# Patient Record
Sex: MALE | Race: WHITE | NOT HISPANIC OR LATINO | Employment: FULL TIME | URBAN - METROPOLITAN AREA
[De-identification: names, ages, dates, MRNs, and addresses within clinical notes are randomized per-mention and may not be internally consistent; named-entity substitution may affect disease eponyms.]

---

## 2020-05-23 ENCOUNTER — OFFICE VISIT (OUTPATIENT)
Dept: URGENT CARE | Facility: CLINIC | Age: 20
End: 2020-05-23
Payer: COMMERCIAL

## 2020-05-23 VITALS
HEART RATE: 115 BPM | BODY MASS INDEX: 39.17 KG/M2 | TEMPERATURE: 99 F | WEIGHT: 315 LBS | HEIGHT: 75 IN | OXYGEN SATURATION: 96 %

## 2020-05-23 DIAGNOSIS — R10.9 ABDOMINAL CRAMPING: Primary | ICD-10-CM

## 2020-05-23 PROCEDURE — 99202 OFFICE O/P NEW SF 15 MIN: CPT | Mod: S$GLB,,, | Performed by: EMERGENCY MEDICINE

## 2020-05-23 PROCEDURE — 99202 PR OFFICE/OUTPT VISIT, NEW, LEVL II, 15-29 MIN: ICD-10-PCS | Mod: S$GLB,,, | Performed by: EMERGENCY MEDICINE

## 2020-05-23 NOTE — LETTER
May 23, 2020      [facility] [place] [address]  Phone: [phone]  Fax: [phone]       Patient: Sukumar Rothman   YOB: 2000  Date of Visit: 05/23/2020    To Whom It May Concern:    Gilberto Rothman  was at [facility] on 05/23/2020. He may return to work/school on 5/24/20 with no restrictions. If you have any questions or concerns, or if I can be of further assistance, please do not hesitate to contact me.    Sincerely,              Jordy Baldwin MD

## 2020-05-23 NOTE — PROGRESS NOTES
"Subjective:       Patient ID: Sukumar Rothman is a 19 y.o. male.    Vitals:  height is 6' 3" (1.905 m) and weight is 145.2 kg (320 lb) (abnormal). His oral temperature is 98.9 °F (37.2 °C). His pulse is 115 (abnormal). His oxygen saturation is 96%.     Chief Complaint: Abdominal Pain    Patient presents with previous abdominal pain yesterday . He says he woke up with his stomach cramping yesterday . He denies diarrhea and or vomiting and nausea . Patient says he tried to go back to work today and they told him he can not come in till he has a note . Patient says issue is completely resolved, states ate lasagna.    Abdominal Pain   This is a new problem. The current episode started yesterday. The onset quality is gradual. The problem occurs intermittently. The problem has been rapidly improving. The pain is located in the generalized abdominal region. The pain is at a severity of 0/10. The patient is experiencing no pain. The abdominal pain does not radiate. Pertinent negatives include no arthralgias, belching, constipation, diarrhea, frequency, headaches, hematochezia, nausea or vomiting. Nothing aggravates the pain. The pain is relieved by nothing. He has tried nothing for the symptoms. The treatment provided no relief. There is no history of colon cancer, irritable bowel syndrome, pancreatitis or ulcerative colitis. Patient's medical history does not include UTI.       Constitution: Negative for chills and sweating.   HENT: Negative for ear pain, ear discharge and congestion.    Respiratory: Negative for cough.    Gastrointestinal: Positive for abdominal pain. Negative for abdominal bloating, nausea, vomiting, constipation, diarrhea and bright red blood in stool.   Genitourinary: Negative for frequency.   Musculoskeletal: Negative for pain, trauma and joint pain.   Neurological: Negative for headaches.       Objective:      Physical Exam   Constitutional: He is oriented to person, place, and time.   Overweight "   HENT:   Head: Normocephalic and atraumatic.   Eyes: No scleral icterus.   Neck: Normal range of motion. Neck supple.   Cardiovascular: Normal rate, regular rhythm and normal heart sounds.   Pulmonary/Chest: Breath sounds normal.   Abdominal: Soft. There is no tenderness. There is no guarding.   Musculoskeletal: Normal range of motion.   Neurological: He is alert and oriented to person, place, and time.   Skin: Skin is warm and dry.   Psychiatric: He has a normal mood and affect. His behavior is normal.         Assessment:       1. Abdominal cramping        Plan:         Abdominal cramping         Jordy Baldwin MD  Go to the Emergency Department for any problems  Call your PCP for follow up next available.

## 2020-05-23 NOTE — PATIENT INSTRUCTIONS
Jordy Baldwin MD  Go to the Emergency Department for any problems  Call your PCP for follow up next available.    Abdominal Pain    Abdominal pain is pain in the stomach or belly area. Everyone has this pain from time to time. In many cases it goes away on its own. But abdominal pain can sometimes be due to a serious problem, such as appendicitis. So its important to know when to seek help.  Causes of abdominal pain  There are many possible causes of abdominal pain. Common causes in adults include:  · Constipation, diarrhea, or gas  · Stomach acid flowing back up into the esophagus (acid reflux or heartburn)  · Severe acid reflux, called GERD (gastroesophageal reflux disease)  · A sore in the lining of the stomach or small intestine (peptic ulcer)  · Inflammation of the gallbladder, liver, or pancreas  · Gallstones or kidney stones  · Appendicitis   · Intestinal blockage   · An internal organ pushing through a muscle or other tissue (hernia)  · Urinary tract infections  · In women, menstrual cramps, fibroids, or endometriosis  · Inflammation or infection of the intestines  Diagnosing the cause of abdominal pain  Your healthcare provider will do a physical exam help find the cause of your pain. If needed, tests will be ordered. Belly pain has many possible causes. So it can be hard to find the reason for your pain. Giving details about your pain can help. Tell your provider where and when you feel the pain, and what makes it better or worse. Also let your provider know if you have other symptoms such as:  · Fever  · Tiredness  · Upset stomach (nausea)  · Vomiting  · Changes in bathroom habits  Treating abdominal pain  Some causes of pain need emergency medical treatment right away. These include appendicitis or a bowel blockage. Other problems can be treated with rest, fluids, or medicines. Your healthcare provider can give you specific instructions for treatment or self-care based on what is causing your pain.  If  you have vomiting or diarrhea, sip water or other clear fluids. When you are ready to eat solid foods again, start with small amounts of easy-to-digest, low-fat foods. These include apple sauce, toast, or crackers.   When to seek medical care  Call 911 or go to the hospital right away if you:  · Cant pass stool and are vomiting  · Are vomiting blood or have bloody diarrhea or black, tarry diarrhea  · Have chest, neck, or shoulder pain  · Feel like you might pass out  · Have pain in your shoulder blades with nausea  · Have sudden, severe belly pain  · Have new, severe pain unlike any you have felt before  · Have a belly that is rigid, hard, and tender to touch  Call your healthcare provider if you have:  · Pain for more than 5 days  · Bloating for more than 2 days  · Diarrhea for more than 5 days  · A fever of 100.4°F (38.0°C) or higher, or as directed by your provider  · Pain that gets worse  · Weight loss for no reason  · Continued lack of appetite  · Blood in your stool  How to prevent abdominal pain  Here are some tips to help prevent abdominal pain:  · Eat smaller amounts of food at one time.  · Avoid greasy, fried, or other high-fat foods.  · Avoid foods that give you gas.  · Exercise regularly.  · Drink plenty of fluids.  To help prevent GERD symptoms:  · Quit smoking.  · Reduce alcohol and certain foods that increase stomach acid.  · Avoid aspirin and over-the-counter pain and fever medicines (NSAIDS or nonsteroidal anti-inflammatory drugs), if possible  · Lose extra weight.  · Finish eating at least 2 hours before you go to bed or lie down.  · Raise the head of your bed.  Date Last Reviewed: 7/1/2016  © 5871-9251 [employer]. [address] [place] [address], [place] [address]. All rights reserved. This information is not intended as a substitute for professional medical care. Always follow your healthcare professional's instructions.

## 2021-04-13 ENCOUNTER — OFFICE VISIT (OUTPATIENT)
Dept: URGENT CARE | Facility: CLINIC | Age: 21
End: 2021-04-13
Payer: COMMERCIAL

## 2021-04-13 VITALS
HEIGHT: 75 IN | WEIGHT: 315 LBS | OXYGEN SATURATION: 97 % | BODY MASS INDEX: 39.17 KG/M2 | DIASTOLIC BLOOD PRESSURE: 73 MMHG | RESPIRATION RATE: 16 BRPM | TEMPERATURE: 98 F | HEART RATE: 77 BPM | SYSTOLIC BLOOD PRESSURE: 110 MMHG

## 2021-04-13 DIAGNOSIS — Z71.6 TOBACCO ABUSE COUNSELING: ICD-10-CM

## 2021-04-13 DIAGNOSIS — L03.011 CELLULITIS OF RIGHT FINGER: ICD-10-CM

## 2021-04-13 DIAGNOSIS — M79.644 FINGER PAIN, RIGHT: ICD-10-CM

## 2021-04-13 DIAGNOSIS — Z23 NEED FOR TDAP VACCINATION: ICD-10-CM

## 2021-04-13 DIAGNOSIS — L03.011 PARONYCHIA OF RIGHT INDEX FINGER: Primary | ICD-10-CM

## 2021-04-13 DIAGNOSIS — E66.01 MORBID OBESITY: ICD-10-CM

## 2021-04-13 PROCEDURE — 3008F PR BODY MASS INDEX (BMI) DOCUMENTED: ICD-10-PCS | Mod: CPTII,S$GLB,, | Performed by: PHYSICIAN ASSISTANT

## 2021-04-13 PROCEDURE — 96372 THER/PROPH/DIAG INJ SC/IM: CPT | Mod: 59,S$GLB,, | Performed by: PHYSICIAN ASSISTANT

## 2021-04-13 PROCEDURE — 3008F BODY MASS INDEX DOCD: CPT | Mod: CPTII,S$GLB,, | Performed by: PHYSICIAN ASSISTANT

## 2021-04-13 PROCEDURE — 90715 TDAP VACCINE GREATER THAN OR EQUAL TO 7YO IM: ICD-10-PCS | Mod: S$GLB,,, | Performed by: PHYSICIAN ASSISTANT

## 2021-04-13 PROCEDURE — 1125F PR PAIN SEVERITY QUANTIFIED, PAIN PRESENT: ICD-10-PCS | Mod: S$GLB,,, | Performed by: PHYSICIAN ASSISTANT

## 2021-04-13 PROCEDURE — 99214 OFFICE O/P EST MOD 30 MIN: CPT | Mod: 25,S$GLB,, | Performed by: PHYSICIAN ASSISTANT

## 2021-04-13 PROCEDURE — 26010 DRAINAGE OF FINGER ABSCESS: CPT | Mod: S$GLB,,, | Performed by: PHYSICIAN ASSISTANT

## 2021-04-13 PROCEDURE — 90471 IMMUNIZATION ADMIN: CPT | Mod: 59,S$GLB,, | Performed by: PHYSICIAN ASSISTANT

## 2021-04-13 PROCEDURE — 99214 PR OFFICE/OUTPT VISIT, EST, LEVL IV, 30-39 MIN: ICD-10-PCS | Mod: 25,S$GLB,, | Performed by: PHYSICIAN ASSISTANT

## 2021-04-13 PROCEDURE — 26010 INCISION & DRAINAGE: ICD-10-PCS | Mod: S$GLB,,, | Performed by: PHYSICIAN ASSISTANT

## 2021-04-13 PROCEDURE — 1125F AMNT PAIN NOTED PAIN PRSNT: CPT | Mod: S$GLB,,, | Performed by: PHYSICIAN ASSISTANT

## 2021-04-13 PROCEDURE — 90471 TDAP VACCINE GREATER THAN OR EQUAL TO 7YO IM: ICD-10-PCS | Mod: 59,S$GLB,, | Performed by: PHYSICIAN ASSISTANT

## 2021-04-13 PROCEDURE — 96372 PR INJECTION,THERAP/PROPH/DIAG2ST, IM OR SUBCUT: ICD-10-PCS | Mod: 59,S$GLB,, | Performed by: PHYSICIAN ASSISTANT

## 2021-04-13 PROCEDURE — 90715 TDAP VACCINE 7 YRS/> IM: CPT | Mod: S$GLB,,, | Performed by: PHYSICIAN ASSISTANT

## 2021-04-13 RX ORDER — KETOROLAC TROMETHAMINE 30 MG/ML
30 INJECTION, SOLUTION INTRAMUSCULAR; INTRAVENOUS
Status: COMPLETED | OUTPATIENT
Start: 2021-04-13 | End: 2021-04-13

## 2021-04-13 RX ORDER — SULFAMETHOXAZOLE AND TRIMETHOPRIM 800; 160 MG/1; MG/1
1 TABLET ORAL 2 TIMES DAILY
Qty: 14 TABLET | Refills: 0 | Status: SHIPPED | OUTPATIENT
Start: 2021-04-13 | End: 2021-04-20

## 2021-04-13 RX ORDER — CLINDAMYCIN PHOSPHATE 150 MG/ML
600 INJECTION, SOLUTION INTRAVENOUS
Status: COMPLETED | OUTPATIENT
Start: 2021-04-13 | End: 2021-04-13

## 2021-04-13 RX ORDER — MUPIROCIN 20 MG/G
OINTMENT TOPICAL 3 TIMES DAILY
Qty: 1 TUBE | Refills: 0 | Status: SHIPPED | OUTPATIENT
Start: 2021-04-13 | End: 2021-04-20

## 2021-04-13 RX ADMIN — KETOROLAC TROMETHAMINE 30 MG: 30 INJECTION, SOLUTION INTRAMUSCULAR; INTRAVENOUS at 10:04

## 2021-04-13 RX ADMIN — CLINDAMYCIN PHOSPHATE 600 MG: 150 INJECTION, SOLUTION INTRAVENOUS at 10:04

## 2021-04-16 ENCOUNTER — PATIENT MESSAGE (OUTPATIENT)
Dept: RESEARCH | Facility: HOSPITAL | Age: 21
End: 2021-04-16

## 2021-11-07 ENCOUNTER — CLINICAL SUPPORT (OUTPATIENT)
Dept: URGENT CARE | Facility: CLINIC | Age: 21
End: 2021-11-07
Payer: COMMERCIAL

## 2021-11-07 DIAGNOSIS — Z78.9 NO KNOWN HEALTH PROBLEMS: Primary | ICD-10-CM

## 2021-11-07 LAB
CTP QC/QA: YES
SARS-COV-2 RDRP RESP QL NAA+PROBE: NEGATIVE

## 2021-11-07 PROCEDURE — 99211 OFF/OP EST MAY X REQ PHY/QHP: CPT | Mod: S$GLB,CS,,

## 2021-11-07 PROCEDURE — U0002: ICD-10-PCS | Mod: QW,S$GLB,,

## 2021-11-07 PROCEDURE — 99211 PR OFFICE/OUTPT VISIT, EST, LEVL I: ICD-10-PCS | Mod: S$GLB,CS,,

## 2021-11-07 PROCEDURE — U0002 COVID-19 LAB TEST NON-CDC: HCPCS | Mod: QW,S$GLB,,

## 2021-11-23 ENCOUNTER — OFFICE VISIT (OUTPATIENT)
Dept: URGENT CARE | Facility: CLINIC | Age: 21
End: 2021-11-23
Payer: COMMERCIAL

## 2021-11-23 VITALS
RESPIRATION RATE: 18 BRPM | HEIGHT: 75 IN | OXYGEN SATURATION: 98 % | HEART RATE: 90 BPM | DIASTOLIC BLOOD PRESSURE: 84 MMHG | TEMPERATURE: 99 F | WEIGHT: 280 LBS | SYSTOLIC BLOOD PRESSURE: 124 MMHG | BODY MASS INDEX: 34.82 KG/M2

## 2021-11-23 DIAGNOSIS — S61.215A LACERATION OF LEFT RING FINGER WITHOUT FOREIGN BODY WITHOUT DAMAGE TO NAIL, INITIAL ENCOUNTER: Primary | ICD-10-CM

## 2021-11-23 PROCEDURE — 99213 PR OFFICE/OUTPT VISIT, EST, LEVL III, 20-29 MIN: ICD-10-PCS | Mod: 25,S$GLB,,

## 2021-11-23 PROCEDURE — 12001 LACERATION REPAIR: ICD-10-PCS | Mod: S$GLB,,,

## 2021-11-23 PROCEDURE — 99213 OFFICE O/P EST LOW 20 MIN: CPT | Mod: 25,S$GLB,,

## 2021-11-23 PROCEDURE — 12001 RPR S/N/AX/GEN/TRNK 2.5CM/<: CPT | Mod: S$GLB,,,

## 2021-11-23 RX ORDER — IBUPROFEN 600 MG/1
600 TABLET ORAL 3 TIMES DAILY
Qty: 30 TABLET | Refills: 0 | Status: SHIPPED | OUTPATIENT
Start: 2021-11-23 | End: 2021-12-03

## 2021-11-23 RX ORDER — DOXYCYCLINE 100 MG/1
100 CAPSULE ORAL 2 TIMES DAILY
Qty: 14 CAPSULE | Refills: 0 | Status: SHIPPED | OUTPATIENT
Start: 2021-11-23 | End: 2021-11-30

## 2021-12-01 ENCOUNTER — CLINICAL SUPPORT (OUTPATIENT)
Dept: URGENT CARE | Facility: CLINIC | Age: 21
End: 2021-12-01
Payer: COMMERCIAL

## 2021-12-01 VITALS
BODY MASS INDEX: 34.82 KG/M2 | WEIGHT: 280 LBS | OXYGEN SATURATION: 96 % | HEART RATE: 100 BPM | RESPIRATION RATE: 18 BRPM | HEIGHT: 75 IN | TEMPERATURE: 98 F | SYSTOLIC BLOOD PRESSURE: 128 MMHG | DIASTOLIC BLOOD PRESSURE: 85 MMHG

## 2021-12-01 DIAGNOSIS — Z48.02 VISIT FOR SUTURE REMOVAL: Primary | ICD-10-CM

## 2021-12-01 PROCEDURE — 99499 NO LOS: ICD-10-PCS | Mod: S$GLB,,,

## 2021-12-01 PROCEDURE — 99499 UNLISTED E&M SERVICE: CPT | Mod: S$GLB,,,

## 2021-12-01 PROCEDURE — 99024 SUTURE REMOVAL: ICD-10-PCS | Mod: S$GLB,,,

## 2021-12-01 PROCEDURE — 99024 POSTOP FOLLOW-UP VISIT: CPT | Mod: S$GLB,,,

## 2022-11-15 ENCOUNTER — HOSPITAL ENCOUNTER (EMERGENCY)
Facility: HOSPITAL | Age: 22
Discharge: HOME | End: 2022-11-15
Attending: EMERGENCY MEDICINE
Payer: COMMERCIAL

## 2022-11-15 VITALS
DIASTOLIC BLOOD PRESSURE: 69 MMHG | WEIGHT: 315 LBS | BODY MASS INDEX: 47.4 KG/M2 | TEMPERATURE: 100 F | SYSTOLIC BLOOD PRESSURE: 133 MMHG | RESPIRATION RATE: 14 BRPM | HEART RATE: 103 BPM | OXYGEN SATURATION: 91 %

## 2022-11-15 DIAGNOSIS — J10.1 INFLUENZA A: Primary | ICD-10-CM

## 2022-11-15 LAB
ANION GAP SERPL CALC-SCNC: 14 MMOL/L (ref 8–16)
BASOPHILS # BLD AUTO: 0.02 K/UL (ref 0–0.2)
BASOPHILS NFR BLD: 0.3 % (ref 0–1.9)
BUN SERPL-MCNC: 12 MG/DL (ref 6–20)
CALCIUM SERPL-MCNC: 9.3 MG/DL (ref 8.7–10.5)
CHLORIDE SERPL-SCNC: 101 MMOL/L (ref 95–110)
CK SERPL-CCNC: 123 U/L (ref 20–200)
CO2 SERPL-SCNC: 20 MMOL/L (ref 23–29)
CREAT SERPL-MCNC: 0.9 MG/DL (ref 0.5–1.4)
CTP QC/QA: YES
DIFFERENTIAL METHOD: ABNORMAL
EOSINOPHIL # BLD AUTO: 0 K/UL (ref 0–0.5)
EOSINOPHIL NFR BLD: 0.3 % (ref 0–8)
ERYTHROCYTE [DISTWIDTH] IN BLOOD BY AUTOMATED COUNT: 13.2 % (ref 11.5–14.5)
EST. GFR  (NO RACE VARIABLE): >60 ML/MIN/1.73 M^2
GLUCOSE SERPL-MCNC: 102 MG/DL (ref 70–110)
HCT VFR BLD AUTO: 38.5 % (ref 40–54)
HGB BLD-MCNC: 13.1 G/DL (ref 14–18)
IMM GRANULOCYTES # BLD AUTO: 0.04 K/UL (ref 0–0.04)
IMM GRANULOCYTES NFR BLD AUTO: 0.6 % (ref 0–0.5)
INFLUENZA A, MOLECULAR: POSITIVE
INFLUENZA B, MOLECULAR: NEGATIVE
LYMPHOCYTES # BLD AUTO: 0.5 K/UL (ref 1–4.8)
LYMPHOCYTES NFR BLD: 7.9 % (ref 18–48)
MCH RBC QN AUTO: 27.4 PG (ref 27–31)
MCHC RBC AUTO-ENTMCNC: 34 G/DL (ref 32–36)
MCV RBC AUTO: 81 FL (ref 82–98)
MONOCYTES # BLD AUTO: 0.9 K/UL (ref 0.3–1)
MONOCYTES NFR BLD: 13.5 % (ref 4–15)
NEUTROPHILS # BLD AUTO: 5 K/UL (ref 1.8–7.7)
NEUTROPHILS NFR BLD: 77.4 % (ref 38–73)
NRBC BLD-RTO: 0 /100 WBC
PLATELET # BLD AUTO: 220 K/UL (ref 150–450)
PMV BLD AUTO: 8.6 FL (ref 9.2–12.9)
POTASSIUM SERPL-SCNC: 3.8 MMOL/L (ref 3.5–5.1)
RBC # BLD AUTO: 4.78 M/UL (ref 4.6–6.2)
SARS-COV-2 RDRP RESP QL NAA+PROBE: NEGATIVE
SODIUM SERPL-SCNC: 135 MMOL/L (ref 136–145)
SPECIMEN SOURCE: ABNORMAL
WBC # BLD AUTO: 6.43 K/UL (ref 3.9–12.7)

## 2022-11-15 PROCEDURE — 63600175 PHARM REV CODE 636 W HCPCS: Performed by: EMERGENCY MEDICINE

## 2022-11-15 PROCEDURE — 25000003 PHARM REV CODE 250: Performed by: EMERGENCY MEDICINE

## 2022-11-15 PROCEDURE — 82550 ASSAY OF CK (CPK): CPT | Performed by: EMERGENCY MEDICINE

## 2022-11-15 PROCEDURE — 80048 BASIC METABOLIC PNL TOTAL CA: CPT | Performed by: EMERGENCY MEDICINE

## 2022-11-15 PROCEDURE — 87502 INFLUENZA DNA AMP PROBE: CPT | Performed by: EMERGENCY MEDICINE

## 2022-11-15 PROCEDURE — 85025 COMPLETE CBC W/AUTO DIFF WBC: CPT | Performed by: EMERGENCY MEDICINE

## 2022-11-15 PROCEDURE — 96361 HYDRATE IV INFUSION ADD-ON: CPT

## 2022-11-15 PROCEDURE — 96374 THER/PROPH/DIAG INJ IV PUSH: CPT

## 2022-11-15 PROCEDURE — 87635 SARS-COV-2 COVID-19 AMP PRB: CPT | Performed by: EMERGENCY MEDICINE

## 2022-11-15 PROCEDURE — 99284 EMERGENCY DEPT VISIT MOD MDM: CPT | Mod: 25

## 2022-11-15 RX ORDER — OSELTAMIVIR PHOSPHATE 75 MG/1
75 CAPSULE ORAL
Status: COMPLETED | OUTPATIENT
Start: 2022-11-15 | End: 2022-11-15

## 2022-11-15 RX ORDER — IBUPROFEN 600 MG/1
600 TABLET ORAL EVERY 6 HOURS PRN
Qty: 20 TABLET | Refills: 0 | Status: SHIPPED | OUTPATIENT
Start: 2022-11-15 | End: 2024-01-19

## 2022-11-15 RX ORDER — ACETAMINOPHEN 500 MG
1000 TABLET ORAL
Status: COMPLETED | OUTPATIENT
Start: 2022-11-15 | End: 2022-11-15

## 2022-11-15 RX ORDER — ACETAMINOPHEN 500 MG
1000 TABLET ORAL EVERY 6 HOURS PRN
Qty: 50 TABLET | Refills: 0 | Status: SHIPPED | OUTPATIENT
Start: 2022-11-15

## 2022-11-15 RX ORDER — ONDANSETRON 4 MG/1
4 TABLET, ORALLY DISINTEGRATING ORAL EVERY 6 HOURS PRN
Qty: 15 TABLET | Refills: 0 | Status: SHIPPED | OUTPATIENT
Start: 2022-11-15 | End: 2024-01-19

## 2022-11-15 RX ORDER — KETOROLAC TROMETHAMINE 30 MG/ML
10 INJECTION, SOLUTION INTRAMUSCULAR; INTRAVENOUS
Status: COMPLETED | OUTPATIENT
Start: 2022-11-15 | End: 2022-11-15

## 2022-11-15 RX ORDER — OSELTAMIVIR PHOSPHATE 75 MG/1
75 CAPSULE ORAL 2 TIMES DAILY
Qty: 10 CAPSULE | Refills: 0 | Status: SHIPPED | OUTPATIENT
Start: 2022-11-15 | End: 2022-11-20

## 2022-11-15 RX ADMIN — KETOROLAC TROMETHAMINE 10 MG: 30 INJECTION, SOLUTION INTRAMUSCULAR; INTRAVENOUS at 04:11

## 2022-11-15 RX ADMIN — SODIUM CHLORIDE 1000 ML: 0.9 INJECTION, SOLUTION INTRAVENOUS at 04:11

## 2022-11-15 RX ADMIN — ACETAMINOPHEN 1000 MG: 500 TABLET ORAL at 03:11

## 2022-11-15 RX ADMIN — OSELTAMIVIR PHOSPHATE 75 MG: 75 CAPSULE ORAL at 04:11

## 2022-11-15 NOTE — ED TRIAGE NOTES
Patient arrived to ED from Urgent care with complaints of elevated HR.   EKG and labs drawn PTA. Unknown results by patient at this time. Patient denies CP, SOB, cough, abdominal pain, dysuria.   States he felt warm today but did not check temperature. Temperature not checked at urgent care PTA.   Awake, alert, no sign of acute distress.

## 2022-11-15 NOTE — Clinical Note
"Sukumar"Ghassan Rothman was seen and treated in our emergency department on 11/15/2022.  He may return to work on 11/21/2022.       If you have any questions or concerns, please don't hesitate to call.      Robert Bull MD"

## 2022-11-15 NOTE — ED PROVIDER NOTES
Encounter Date: 11/15/2022       History     Chief Complaint   Patient presents with    Fever     Pt was recently placed on antibiotics for sinus infection,  pt continues to complains of nasal congestion and pressure in face, denies n/v      This 22-year-old man who presents the emergency department after having gone to an urgent care for persistent symptoms despite having been treated for sinus infection 2 weeks prior.  He notes that today he began have a fever, upon arrival to the urgent care he was given a prescription for metoprolol and then referred to the emergency department for further evaluation.  On arrival here he is tachycardic, febrile.  He endorses a frontal headache, has not take anything to try and help with this, nothing in particular seems to make it any better, it seems to be progressive in mildly worsening with time.  He can not recall anything like this in the past.    Review of patient's allergies indicates:  No Known Allergies  No past medical history on file.  No past surgical history on file.  Family History   Problem Relation Age of Onset    No Known Problems Mother     No Known Problems Father      Social History     Tobacco Use    Smoking status: Every Day    Smokeless tobacco: Never   Substance Use Topics    Drug use: Never     Review of Systems  Constitutional-positive fever  HEENT-no congestion  Eyes-no redness  Respiratory-no shortness of breath  Cardio-no chest pain  GI-no abdominal pain  Endocrine-no cold intolerance  -no difficulty urinating  MSK-no myalgias  Skin-no rashes  Allergy-no environmental allergy  Neurologic-positive headache  Hematology-no swollen nodes  Behavioral-no confusion  Physical Exam     Initial Vitals [11/15/22 1504]   BP Pulse Resp Temp SpO2   131/80 (!) 138 (!) 24 (!) 102.9 °F (39.4 °C) 95 %      MAP       --         Physical Exam  Constitutional:  Generally well-appearing 22-year-old man in no obvious distress  Eyes: Conjunctivae normal.  ENT       Head:  Normocephalic, atraumatic.       Nose: Normal external appearance        Mouth/Throat: no strigulous respirations   Hematological/Lymphatic/Immunilogical: no visible lymphadenopathy   Cardiovascular:  Rapid rate,   Respiratory: Normal respiratory effort.   Gastrointestinal: non distended   Musculoskeletal: Normal range of motion in all extremities. No obvious deformities or swelling.  Neurologic: Alert, oriented. Normal speech and language. No gross focal neurologic deficits are appreciated.  Skin: Skin is warm, dry. No rash noted.  Psychiatric: Mood and affect are normal.   ED Course   Procedures  Labs Reviewed   INFLUENZA A & B BY MOLECULAR - Abnormal; Notable for the following components:       Result Value    Influenza A, Molecular Positive (*)     All other components within normal limits   BASIC METABOLIC PANEL - Abnormal; Notable for the following components:    Sodium 135 (*)     CO2 20 (*)     All other components within normal limits   CBC W/ AUTO DIFFERENTIAL - Abnormal; Notable for the following components:    Hemoglobin 13.1 (*)     Hematocrit 38.5 (*)     MCV 81 (*)     MPV 8.6 (*)     Immature Granulocytes 0.6 (*)     Lymph # 0.5 (*)     Gran % 77.4 (*)     Lymph % 7.9 (*)     All other components within normal limits   CK   SARS-COV-2 RDRP GENE          Imaging Results    None          Medications   sodium chloride 0.9% bolus 1,000 mL (1,000 mLs Intravenous New Bag 11/15/22 1616)   acetaminophen tablet 1,000 mg (1,000 mg Oral Given 11/15/22 1507)   ketorolac injection 9.999 mg (9.999 mg Intravenous Given 11/15/22 1614)   oseltamivir capsule 75 mg (75 mg Oral Given 11/15/22 1614)     Medical Decision Making:   History:   I obtained history from: someone other than patient.       <> Summary of History: Father at the bedside notes redness in the face  Old Medical Records: I decided to obtain old medical records.  Old Records Summarized: records from clinic visits and records from previous  admission(s).  Differential Diagnosis:   Flu, COVID, pneumonia, sepsis  Clinical Tests:   Lab Tests: Ordered and Reviewed  ED Management:  Febrile, tachycardic on arrival.  One day of fever.  Flu A positive, likely his source of infection at this time.  Will plan for treatment of the same, he is comfortable on room air at this time, neurologically intact and overall well-appearing.    Has improved with treatment of his fever in the emergency department.  Discussed the importance of returning case of worsening symptoms and appropriate symptom care in the outpatient setting.                        Clinical Impression:   Final diagnoses:  [J10.1] Influenza A (Primary)        ED Disposition Condition    Discharge Stable          ED Prescriptions       Medication Sig Dispense Start Date End Date Auth. Provider    oseltamivir (TAMIFLU) 75 MG capsule Take 1 capsule (75 mg total) by mouth 2 (two) times daily. for 5 days 10 capsule 11/15/2022 11/20/2022 Robert Bull MD    acetaminophen (TYLENOL) 500 MG tablet Take 2 tablets (1,000 mg total) by mouth every 6 (six) hours as needed. 50 tablet 11/15/2022 -- Robert Bull MD    ibuprofen (ADVIL,MOTRIN) 600 MG tablet Take 1 tablet (600 mg total) by mouth every 6 (six) hours as needed for Pain. 20 tablet 11/15/2022 -- Robert Bull MD    ondansetron (ZOFRAN-ODT) 4 MG TbDL Take 1 tablet (4 mg total) by mouth every 6 (six) hours as needed (nausea). 15 tablet 11/15/2022 -- Robert Bull MD          Follow-up Information       Follow up With Specialties Details Why Contact Info    [facility] Emergency Dept Emergency Medicine Go to  As needed [address] [facility] [address] [phone]             Robert Bull MD  11/15/22 1013

## 2022-11-15 NOTE — DISCHARGE INSTRUCTIONS
Mr. Rothman,    Thank you for letting me care for you today! It was nice meeting you, and I hope you feel better soon.   If you would like access to your chart and what was done today please utilize the [facility].   Please come back to [facility] for all of your future medical needs.    Our goal in the emergency department is to always give you outstanding care and exceptional service. You may receive a survey by mail or e-mail in the next week regarding your experience in our ED. We would greatly appreciate you completing and returning the survey. Your feedback provides us with a way to recognize our staff who give very good care and it helps us learn how to improve when your experience was below our aspiration of excellence.     Sincerely,    Robert Bull MD  Board Certified Emergency Physician

## 2023-03-21 ENCOUNTER — LAB VISIT (OUTPATIENT)
Dept: LAB | Facility: HOSPITAL | Age: 23
End: 2023-03-21
Attending: INTERNAL MEDICINE
Payer: COMMERCIAL

## 2023-03-21 ENCOUNTER — OFFICE VISIT (OUTPATIENT)
Dept: RHEUMATOLOGY | Facility: CLINIC | Age: 23
End: 2023-03-21
Payer: COMMERCIAL

## 2023-03-21 VITALS
SYSTOLIC BLOOD PRESSURE: 132 MMHG | BODY MASS INDEX: 39.17 KG/M2 | HEIGHT: 75 IN | WEIGHT: 315 LBS | DIASTOLIC BLOOD PRESSURE: 86 MMHG | OXYGEN SATURATION: 98 % | HEART RATE: 121 BPM

## 2023-03-21 DIAGNOSIS — R21 RASH: ICD-10-CM

## 2023-03-21 DIAGNOSIS — E66.01 MORBID OBESITY: ICD-10-CM

## 2023-03-21 DIAGNOSIS — R76.8 POSITIVE ANA (ANTINUCLEAR ANTIBODY): ICD-10-CM

## 2023-03-21 DIAGNOSIS — Z71.89 COUNSELING AND COORDINATION OF CARE: ICD-10-CM

## 2023-03-21 DIAGNOSIS — R21 RASH: Primary | ICD-10-CM

## 2023-03-21 DIAGNOSIS — M25.50 ARTHRALGIA, UNSPECIFIED JOINT: ICD-10-CM

## 2023-03-21 LAB
25(OH)D3+25(OH)D2 SERPL-MCNC: 15 NG/ML (ref 30–96)
ALBUMIN SERPL BCP-MCNC: 3.9 G/DL (ref 3.5–5.2)
ALP SERPL-CCNC: 109 U/L (ref 55–135)
ALT SERPL W/O P-5'-P-CCNC: 40 U/L (ref 10–44)
ANION GAP SERPL CALC-SCNC: 9 MMOL/L (ref 8–16)
AST SERPL-CCNC: 23 U/L (ref 10–40)
BASOPHILS # BLD AUTO: 0.03 K/UL (ref 0–0.2)
BASOPHILS NFR BLD: 0.3 % (ref 0–1.9)
BILIRUB SERPL-MCNC: 0.5 MG/DL (ref 0.1–1)
BUN SERPL-MCNC: 13 MG/DL (ref 6–20)
C3 SERPL-MCNC: 173 MG/DL (ref 50–180)
C4 SERPL-MCNC: 36 MG/DL (ref 11–44)
CALCIUM SERPL-MCNC: 9.8 MG/DL (ref 8.7–10.5)
CCP AB SER IA-ACNC: <0.5 U/ML
CHLORIDE SERPL-SCNC: 103 MMOL/L (ref 95–110)
CK SERPL-CCNC: 96 U/L (ref 20–200)
CO2 SERPL-SCNC: 26 MMOL/L (ref 23–29)
CREAT SERPL-MCNC: 0.8 MG/DL (ref 0.5–1.4)
CRP SERPL-MCNC: 7.3 MG/L (ref 0–8.2)
DIFFERENTIAL METHOD: NORMAL
EOSINOPHIL # BLD AUTO: 0.1 K/UL (ref 0–0.5)
EOSINOPHIL NFR BLD: 1.1 % (ref 0–8)
ERYTHROCYTE [DISTWIDTH] IN BLOOD BY AUTOMATED COUNT: 13.9 % (ref 11.5–14.5)
ERYTHROCYTE [SEDIMENTATION RATE] IN BLOOD BY PHOTOMETRIC METHOD: 36 MM/HR (ref 0–23)
EST. GFR  (NO RACE VARIABLE): >60 ML/MIN/1.73 M^2
GLUCOSE SERPL-MCNC: 94 MG/DL (ref 70–110)
HBV SURFACE AB SER-ACNC: 130.11 MIU/ML
HBV SURFACE AB SER-ACNC: REACTIVE M[IU]/ML
HBV SURFACE AG SERPL QL IA: NORMAL
HCT VFR BLD AUTO: 44.2 % (ref 40–54)
HCV AB SERPL QL IA: NORMAL
HGB BLD-MCNC: 14.4 G/DL (ref 14–18)
HIV 1+2 AB+HIV1 P24 AG SERPL QL IA: NORMAL
IGA SERPL-MCNC: 341 MG/DL (ref 40–350)
IGG SERPL-MCNC: 1260 MG/DL (ref 650–1600)
IGM SERPL-MCNC: 185 MG/DL (ref 50–300)
IMM GRANULOCYTES # BLD AUTO: 0.03 K/UL (ref 0–0.04)
IMM GRANULOCYTES NFR BLD AUTO: 0.3 % (ref 0–0.5)
LYMPHOCYTES # BLD AUTO: 2.8 K/UL (ref 1–4.8)
LYMPHOCYTES NFR BLD: 32 % (ref 18–48)
MCH RBC QN AUTO: 27.4 PG (ref 27–31)
MCHC RBC AUTO-ENTMCNC: 32.6 G/DL (ref 32–36)
MCV RBC AUTO: 84 FL (ref 82–98)
MONOCYTES # BLD AUTO: 0.6 K/UL (ref 0.3–1)
MONOCYTES NFR BLD: 7.3 % (ref 4–15)
NEUTROPHILS # BLD AUTO: 5.2 K/UL (ref 1.8–7.7)
NEUTROPHILS NFR BLD: 59 % (ref 38–73)
NRBC BLD-RTO: 0 /100 WBC
PLATELET # BLD AUTO: 303 K/UL (ref 150–450)
PMV BLD AUTO: 9.9 FL (ref 9.2–12.9)
POTASSIUM SERPL-SCNC: 4.1 MMOL/L (ref 3.5–5.1)
PROT SERPL-MCNC: 7.8 G/DL (ref 6–8.4)
RBC # BLD AUTO: 5.25 M/UL (ref 4.6–6.2)
RHEUMATOID FACT SERPL-ACNC: <13 IU/ML (ref 0–15)
SODIUM SERPL-SCNC: 138 MMOL/L (ref 136–145)
THYROPEROXIDASE IGG SERPL-ACNC: <6 IU/ML
TSH SERPL DL<=0.005 MIU/L-ACNC: 0.76 UIU/ML (ref 0.4–4)
WBC # BLD AUTO: 8.78 K/UL (ref 3.9–12.7)

## 2023-03-21 PROCEDURE — 82306 VITAMIN D 25 HYDROXY: CPT | Performed by: INTERNAL MEDICINE

## 2023-03-21 PROCEDURE — 85613 RUSSELL VIPER VENOM DILUTED: CPT | Performed by: INTERNAL MEDICINE

## 2023-03-21 PROCEDURE — 86235 NUCLEAR ANTIGEN ANTIBODY: CPT | Mod: 59 | Performed by: INTERNAL MEDICINE

## 2023-03-21 PROCEDURE — 86140 C-REACTIVE PROTEIN: CPT | Performed by: INTERNAL MEDICINE

## 2023-03-21 PROCEDURE — 82784 ASSAY IGA/IGD/IGG/IGM EACH: CPT | Mod: 59 | Performed by: INTERNAL MEDICINE

## 2023-03-21 PROCEDURE — 85025 COMPLETE CBC W/AUTO DIFF WBC: CPT | Performed by: INTERNAL MEDICINE

## 2023-03-21 PROCEDURE — 1159F PR MEDICATION LIST DOCUMENTED IN MEDICAL RECORD: ICD-10-PCS | Mod: CPTII,S$GLB,, | Performed by: INTERNAL MEDICINE

## 2023-03-21 PROCEDURE — 86160 COMPLEMENT ANTIGEN: CPT | Mod: 59 | Performed by: INTERNAL MEDICINE

## 2023-03-21 PROCEDURE — 86038 ANTINUCLEAR ANTIBODIES: CPT | Performed by: INTERNAL MEDICINE

## 2023-03-21 PROCEDURE — 85652 RBC SED RATE AUTOMATED: CPT | Performed by: INTERNAL MEDICINE

## 2023-03-21 PROCEDURE — 3008F BODY MASS INDEX DOCD: CPT | Mod: CPTII,S$GLB,, | Performed by: INTERNAL MEDICINE

## 2023-03-21 PROCEDURE — 80053 COMPREHEN METABOLIC PANEL: CPT | Performed by: INTERNAL MEDICINE

## 2023-03-21 PROCEDURE — 3079F DIAST BP 80-89 MM HG: CPT | Mod: CPTII,S$GLB,, | Performed by: INTERNAL MEDICINE

## 2023-03-21 PROCEDURE — 85730 THROMBOPLASTIN TIME PARTIAL: CPT | Performed by: INTERNAL MEDICINE

## 2023-03-21 PROCEDURE — 1159F MED LIST DOCD IN RCRD: CPT | Mod: CPTII,S$GLB,, | Performed by: INTERNAL MEDICINE

## 2023-03-21 PROCEDURE — 87389 HIV-1 AG W/HIV-1&-2 AB AG IA: CPT | Performed by: INTERNAL MEDICINE

## 2023-03-21 PROCEDURE — 85635 REPTILASE TEST: CPT | Performed by: INTERNAL MEDICINE

## 2023-03-21 PROCEDURE — 86480 TB TEST CELL IMMUN MEASURE: CPT | Performed by: INTERNAL MEDICINE

## 2023-03-21 PROCEDURE — 84165 PROTEIN E-PHORESIS SERUM: CPT | Performed by: INTERNAL MEDICINE

## 2023-03-21 PROCEDURE — 99204 PR OFFICE/OUTPT VISIT, NEW, LEVL IV, 45-59 MIN: ICD-10-PCS | Mod: S$GLB,,, | Performed by: INTERNAL MEDICINE

## 2023-03-21 PROCEDURE — 83516 IMMUNOASSAY NONANTIBODY: CPT | Performed by: INTERNAL MEDICINE

## 2023-03-21 PROCEDURE — 84165 PATHOLOGIST INTERPRETATION SPE: ICD-10-PCS | Mod: 26,,, | Performed by: PATHOLOGY

## 2023-03-21 PROCEDURE — 86225 DNA ANTIBODY NATIVE: CPT | Performed by: INTERNAL MEDICINE

## 2023-03-21 PROCEDURE — 86200 CCP ANTIBODY: CPT | Performed by: INTERNAL MEDICINE

## 2023-03-21 PROCEDURE — 86235 NUCLEAR ANTIGEN ANTIBODY: CPT | Performed by: INTERNAL MEDICINE

## 2023-03-21 PROCEDURE — 86147 CARDIOLIPIN ANTIBODY EA IG: CPT | Performed by: INTERNAL MEDICINE

## 2023-03-21 PROCEDURE — 86431 RHEUMATOID FACTOR QUANT: CPT | Performed by: INTERNAL MEDICINE

## 2023-03-21 PROCEDURE — 99999 PR PBB SHADOW E&M-EST. PATIENT-LVL IV: CPT | Mod: PBBFAC,,, | Performed by: INTERNAL MEDICINE

## 2023-03-21 PROCEDURE — 3079F PR MOST RECENT DIASTOLIC BLOOD PRESSURE 80-89 MM HG: ICD-10-PCS | Mod: CPTII,S$GLB,, | Performed by: INTERNAL MEDICINE

## 2023-03-21 PROCEDURE — 85525 HEPARIN NEUTRALIZATION: CPT | Performed by: INTERNAL MEDICINE

## 2023-03-21 PROCEDURE — 87340 HEPATITIS B SURFACE AG IA: CPT | Performed by: INTERNAL MEDICINE

## 2023-03-21 PROCEDURE — 82550 ASSAY OF CK (CPK): CPT | Performed by: INTERNAL MEDICINE

## 2023-03-21 PROCEDURE — 82787 IGG 1 2 3 OR 4 EACH: CPT | Mod: 59 | Performed by: INTERNAL MEDICINE

## 2023-03-21 PROCEDURE — 86334 PATHOLOGIST INTERPRETATION IFE: ICD-10-PCS | Mod: 26,,, | Performed by: PATHOLOGY

## 2023-03-21 PROCEDURE — 85732 THROMBOPLASTIN TIME PARTIAL: CPT | Performed by: INTERNAL MEDICINE

## 2023-03-21 PROCEDURE — 84165 PROTEIN E-PHORESIS SERUM: CPT | Mod: 26,,, | Performed by: PATHOLOGY

## 2023-03-21 PROCEDURE — 85613 RUSSELL VIPER VENOM DILUTED: CPT | Mod: 91 | Performed by: INTERNAL MEDICINE

## 2023-03-21 PROCEDURE — 3075F SYST BP GE 130 - 139MM HG: CPT | Mod: CPTII,S$GLB,, | Performed by: INTERNAL MEDICINE

## 2023-03-21 PROCEDURE — 86146 BETA-2 GLYCOPROTEIN ANTIBODY: CPT | Performed by: INTERNAL MEDICINE

## 2023-03-21 PROCEDURE — 3008F PR BODY MASS INDEX (BMI) DOCUMENTED: ICD-10-PCS | Mod: CPTII,S$GLB,, | Performed by: INTERNAL MEDICINE

## 2023-03-21 PROCEDURE — 84445 ASSAY OF TSI GLOBULIN: CPT | Performed by: INTERNAL MEDICINE

## 2023-03-21 PROCEDURE — 86376 MICROSOMAL ANTIBODY EACH: CPT | Performed by: INTERNAL MEDICINE

## 2023-03-21 PROCEDURE — 86803 HEPATITIS C AB TEST: CPT | Performed by: INTERNAL MEDICINE

## 2023-03-21 PROCEDURE — 86334 IMMUNOFIX E-PHORESIS SERUM: CPT | Mod: 26,,, | Performed by: PATHOLOGY

## 2023-03-21 PROCEDURE — 86706 HEP B SURFACE ANTIBODY: CPT | Performed by: INTERNAL MEDICINE

## 2023-03-21 PROCEDURE — 85670 THROMBIN TIME PLASMA: CPT | Performed by: INTERNAL MEDICINE

## 2023-03-21 PROCEDURE — 86334 IMMUNOFIX E-PHORESIS SERUM: CPT | Performed by: INTERNAL MEDICINE

## 2023-03-21 PROCEDURE — 84443 ASSAY THYROID STIM HORMONE: CPT | Performed by: INTERNAL MEDICINE

## 2023-03-21 PROCEDURE — 99999 PR PBB SHADOW E&M-EST. PATIENT-LVL IV: ICD-10-PCS | Mod: PBBFAC,,, | Performed by: INTERNAL MEDICINE

## 2023-03-21 PROCEDURE — 86160 COMPLEMENT ANTIGEN: CPT | Performed by: INTERNAL MEDICINE

## 2023-03-21 PROCEDURE — 99204 OFFICE O/P NEW MOD 45 MIN: CPT | Mod: S$GLB,,, | Performed by: INTERNAL MEDICINE

## 2023-03-21 PROCEDURE — 3075F PR MOST RECENT SYSTOLIC BLOOD PRESS GE 130-139MM HG: ICD-10-PCS | Mod: CPTII,S$GLB,, | Performed by: INTERNAL MEDICINE

## 2023-03-21 NOTE — PROGRESS NOTES
RHEUMATOLOGY OUTPATIENT CLINIC NOTE    3/21/2023    Attending Rheumatologist: Mp Prado  Primary Care Provider: Primary Doctor No   Physician Requesting Consultation: Aaareferral Self  No address on file  Chief Complaint/Reason For Consultation:  Rash (Stiffness on head on and off/)      Subjective:       HPI  Sukumar Rothman is a 22 y.o. White male with medical history noted below who presents for evaluation of Lupus.     Patient presents for evaluation of Lupus. Notes he went to see a doctor in [facility] in October/November due to rash on face and facial warmth and was told to her had +MARY. He notes when working in the sun, being over heated and rash will break out. No change with alcohol. No rash elsewhere. At times has noted neck pain/stiffness, no clear exacerbating or alleviating factors. No FHX of CTD. Arthralgias of the knees and ankles, worse with activity, though not daily or affecting life. Does not take anything for pain. +Wt loss, fatigue. No Alopecia, Oral/Nasal Ulcers, Dry Eyes, Dry Mouth, Pleuritis/serositis, Arthritis, Easy Bruising, LAD, Raynaud's, Blood clots, GERD, Skin tightening, SOB, chest pain, fevers, wt loss, constipation/diarrhea, urinary issues. Of note works in [employer] outdoors.       Review of Systems   Constitutional:  Positive for fatigue. Negative for chills, fever and unexpected weight change.   HENT:  Negative for mouth sores and trouble swallowing.    Eyes:  Negative for redness and eye dryness.   Respiratory:  Negative for cough and shortness of breath.    Cardiovascular:  Negative for chest pain.   Gastrointestinal:  Negative for abdominal distention, constipation, diarrhea, nausea, vomiting and reflux.   Musculoskeletal:  Positive for arthralgias. Negative for back pain, gait problem, joint swelling, leg pain, myalgias, neck pain, neck stiffness and joint deformity.   Integumentary:  Positive for rash.   Neurological:  Positive for headaches. Negative for  weakness, numbness and memory loss.   Hematological:  Negative for adenopathy. Does not bruise/bleed easily.   Psychiatric/Behavioral:  Negative for confusion, decreased concentration, sleep disturbance and suicidal ideas. The patient is not nervous/anxious.    All other systems reviewed and are negative.     Chronic comorbid conditions affecting medical decision making today:  History reviewed. No pertinent past medical history.  History reviewed. No pertinent surgical history.  Family History   Problem Relation Age of Onset    No Known Problems Mother     No Known Problems Father      Social History     Substance and Sexual Activity   Alcohol Use None     Social History     Tobacco Use   Smoking Status Every Day   Smokeless Tobacco Never     Social History     Substance and Sexual Activity   Drug Use Never       Current Outpatient Medications:     acetaminophen (TYLENOL) 500 MG tablet, Take 2 tablets (1,000 mg total) by mouth every 6 (six) hours as needed., Disp: 50 tablet, Rfl: 0    ibuprofen (ADVIL,MOTRIN) 600 MG tablet, Take 1 tablet (600 mg total) by mouth every 6 (six) hours as needed for Pain., Disp: 20 tablet, Rfl: 0    ondansetron (ZOFRAN-ODT) 4 MG TbDL, Take 1 tablet (4 mg total) by mouth every 6 (six) hours as needed (nausea)., Disp: 15 tablet, Rfl: 0     Objective:         Vitals:    03/21/23 1002   BP: 132/86   Pulse: (!) 121     Physical Exam  Obese  Rash on face  Neck ROM ok  Can make fist, no synovitis  Wrists, Elbows, Shoulder ROM ok  Knee crepitus  Negative ankle/MTP    Reviewed old and all outside pertinent medical records available.    All lab results personally reviewed and interpreted by me.  Lab Results   Component Value Date    WBC 6.43 11/15/2022    HGB 13.1 (L) 11/15/2022    HCT 38.5 (L) 11/15/2022    MCV 81 (L) 11/15/2022    MCH 27.4 11/15/2022    MCHC 34.0 11/15/2022    RDW 13.2 11/15/2022     11/15/2022    MPV 8.6 (L) 11/15/2022       Lab Results   Component Value Date      (L) 11/15/2022    K 3.8 11/15/2022     11/15/2022    CO2 20 (L) 11/15/2022     11/15/2022    BUN 12 11/15/2022    CALCIUM 9.3 11/15/2022       No results found for: COLORU, APPEARANCEUA, SPECGRAV, PHUR, PHUA, PROTEINUA, GLUCOSEU, KETONESU, BLOODU, LEUKOCYTESUR, NITRITE, UROBILINOGEN    No results found for: CRP    No results found for: SEDRATE, ERYTHROCYTES    No results found for: MARY, RF, SEDRATE    No components found for: 25OHVITDTOT, 66OJSLSA7, 10TOHVDO3, METHODNOTE    No results found for: URICACID    No components found for: TSPOTTB        Imaging:  All imaging reviewed and independently interpreted by me.         ASSESSMENT / PLAN:     Sukumar Rothman is a 22 y.o. White male with:      1. Rash  - has facial rash, photosensitive otherwise no other evidence of CTD  - discussed symptoms and monitoring and work up  - will refer to DERM to see other possibilities or even topical options  - sun screen and sun protective clothing advised  - work up as below  - reassurance   - Ambulatory referral/consult to Dermatology; Future  - C-Reactive Protein; Future  - MARY Screen w/Reflex; Future  - Rheumatoid Factor; Future  - Sjogrens syndrome-B extractable nuclear antibody; Future  - CBC Auto Differential; Future  - Comprehensive Metabolic Panel; Future  - Cyclic Citrullinated Peptide Antibody, IgG; Future  - Sjogrens syndrome-A extractable nuclear antibody; Future  - Protein/Creatinine Ratio, Urine; Future  - C4 Complement; Future  - C3 Complement; Future  - Anti-Histone Antibody; Future  - Sedimentation rate; Future  - Urinalysis; Standing  - Hepatitis B Surface Ab, Qualitative; Future  - Vitamin D; Future  - TSH; Future  - Hepatitis C Antibody; Future  - Hepatitis B Surface Antigen; Future  - CK; Future  - Anti-Scleroderma Antibody; Future  - Quantiferon Gold TB; Future  - HIV 1/2 Ag/Ab (4th Gen); Future  - IgG 1, 2, 3, and 4; Future  - Immunoglobulins (IgG, IgA, IgM) Quantitative; Future  - Protein  Electrophoresis, Serum; Future  - Immunofixation Electrophoresis; Future  - DRVVT; Future  - Cardiolipin antibody; Future  - Beta-2 Glycoprotein Abs (IgA, IgG, IgM); Future  - Anti-DNA Ab, Double-Stranded; Future  - X-Ray Cervical Spine AP Lat with Flex Ex; Future  - THYROID PEROXIDASE ANTIBODY; Future  - THYROID STIMULATING IMMUNOGLOBULIN; Future    2. Positive MARY (antinuclear antibody)  - no MARY available for review  - will get labs as above     3. Arthralgia, unspecified joint  - likely functional   - discussed diagnosis and management  - wt loss  - Tylenol PRN  - reassurance and exercise     4. Other specified counseling  - over 10 minutes spent regarding below topics:  - Immunization counseling done.  - Weight loss counseling done.  - Nutrition and exercise counseling.  - Limitation of alcohol consumption.  - Regular exercise:  Aerobic and resistance.  - Medication counseling provided.    5. Morbid Obesity  - would benefit from decreasing at least 10% of body weight.  - recommended goal of losing 1 lb per week.  - consider nutritionist evaluation.      Follow up in about 8 weeks (around 5/16/2023).    Method of contact with patient concerns: [facility]    Disclaimer:  This note is prepared using voice recognition software and as such is likely to have errors and has not been proof read. Please contact me for questions.     Time spent: 45 minutes in face to face discussion concerning diagnosis, prognosis, review of lab and test results, benefits of treatment as well as management of disease, counseling of patient and coordination of care between various health care providers.  Greater than half the time spent was used for coordination of care and counseling of patient.    Mp Prado M.D.  Rheumatology Department   [facility]

## 2023-03-22 LAB
ALBUMIN SERPL ELPH-MCNC: 4.03 G/DL (ref 3.35–5.55)
ALPHA1 GLOB SERPL ELPH-MCNC: 0.32 G/DL (ref 0.17–0.41)
ALPHA2 GLOB SERPL ELPH-MCNC: 0.82 G/DL (ref 0.43–0.99)
ANA SER QL IF: NORMAL
ANTI-SSA ANTIBODY: 0.07 RATIO (ref 0–0.99)
ANTI-SSA INTERPRETATION: NEGATIVE
ANTI-SSB ANTIBODY: 0.05 RATIO (ref 0–0.99)
ANTI-SSB INTERPRETATION: NEGATIVE
B-GLOBULIN SERPL ELPH-MCNC: 1 G/DL (ref 0.5–1.1)
DSDNA AB SER-ACNC: NORMAL [IU]/ML
GAMMA GLOB SERPL ELPH-MCNC: 1.23 G/DL (ref 0.67–1.58)
GAMMA INTERFERON BACKGROUND BLD IA-ACNC: 0.23 IU/ML
INTERPRETATION SERPL IFE-IMP: NORMAL
M TB IFN-G CD4+ BCKGRND COR BLD-ACNC: -0.03 IU/ML
MITOGEN IGNF BCKGRD COR BLD-ACNC: 9.77 IU/ML
PROT SERPL-MCNC: 7.4 G/DL (ref 6–8.4)
TB GOLD PLUS: NEGATIVE
TB2 - NIL: -0.03 IU/ML

## 2023-03-23 LAB
PATHOLOGIST INTERPRETATION IFE: NORMAL
PATHOLOGIST INTERPRETATION SPE: NORMAL

## 2023-03-24 LAB
B2 GLYCOPROT1 IGA SER QL: 2.7 U/ML
B2 GLYCOPROT1 IGG SER QL: 1.3 U/ML
B2 GLYCOPROT1 IGM SER QL: <2.4 U/ML
CARDIOLIPIN IGG SER IA-ACNC: <9.4 GPL (ref 0–14.99)
CARDIOLIPIN IGM SER IA-ACNC: <9.4 MPL (ref 0–12.49)
ENA SCL70 AB SER-ACNC: <0.6 U/ML
HISTONE IGG SER IA-ACNC: 1.3 UNITS (ref 0–0.9)
IGG1 SER-MCNC: 529 MG/DL (ref 382–929)
IGG2 SER-MCNC: 487 MG/DL (ref 242–700)
IGG3 SER-MCNC: 68 MG/DL (ref 22–176)
IGG4 SER-MCNC: 68 MG/DL (ref 4–86)
TSI SER-ACNC: <0.1 IU/L

## 2023-03-27 ENCOUNTER — HOSPITAL ENCOUNTER (OUTPATIENT)
Dept: RADIOLOGY | Facility: HOSPITAL | Age: 23
Discharge: HOME | End: 2023-03-27
Attending: INTERNAL MEDICINE
Payer: COMMERCIAL

## 2023-03-27 DIAGNOSIS — R21 RASH: ICD-10-CM

## 2023-03-27 LAB
APTT IMM NP PPP: 44 SEC (ref 32–48)
APTT P HEP NEUT PPP: 51 SEC (ref 32–48)
CONFIRM APTT STACLOT: ABNORMAL
DRVVT SCREEN TO CONFIRM RATIO: ABNORMAL {RATIO}
LA 3 SCREEN W REFLEX-IMP: ABNORMAL
LA NT DPL PPP QL: ABNORMAL
MIXING DRVVT: 42 SEC (ref 33–44)
PROTHROMBIN TIME: 13.5 SEC (ref 12–15.5)
REPTILASE TIME: 19.2 SEC
SCREEN APTT: 53 SEC (ref 32–48)
SCREEN DRVVT: 47 SEC (ref 33–44)
THROMBIN TIME: 19.6 SEC (ref 14.7–19.5)

## 2023-03-27 PROCEDURE — 72050 X-RAY EXAM NECK SPINE 4/5VWS: CPT | Mod: TC,FY,PO

## 2023-03-27 PROCEDURE — 72050 X-RAY EXAM NECK SPINE 4/5VWS: CPT | Mod: 26,,, | Performed by: RADIOLOGY

## 2023-03-27 PROCEDURE — 72050 XR CERVICAL SPINE AP LAT WITH FLEX EXTEN: ICD-10-PCS | Mod: 26,,, | Performed by: RADIOLOGY

## 2023-04-03 ENCOUNTER — TELEPHONE (OUTPATIENT)
Dept: RHEUMATOLOGY | Facility: CLINIC | Age: 23
End: 2023-04-03
Payer: COMMERCIAL

## 2023-04-03 RX ORDER — ERGOCALCIFEROL 1.25 MG/1
50000 CAPSULE ORAL
Qty: 12 CAPSULE | Refills: 0 | Status: SHIPPED | OUTPATIENT
Start: 2023-04-03 | End: 2024-01-19

## 2023-04-21 ENCOUNTER — PATIENT MESSAGE (OUTPATIENT)
Dept: RHEUMATOLOGY | Facility: CLINIC | Age: 23
End: 2023-04-21
Payer: COMMERCIAL

## 2023-09-16 ENCOUNTER — OFFICE VISIT (OUTPATIENT)
Dept: URGENT CARE | Facility: CLINIC | Age: 23
End: 2023-09-16
Payer: COMMERCIAL

## 2023-09-16 VITALS
DIASTOLIC BLOOD PRESSURE: 81 MMHG | BODY MASS INDEX: 39.17 KG/M2 | HEART RATE: 78 BPM | OXYGEN SATURATION: 95 % | RESPIRATION RATE: 18 BRPM | TEMPERATURE: 98 F | WEIGHT: 315 LBS | HEIGHT: 75 IN | SYSTOLIC BLOOD PRESSURE: 123 MMHG

## 2023-09-16 DIAGNOSIS — K12.2 UVULITIS: Primary | ICD-10-CM

## 2023-09-16 LAB
CTP QC/QA: YES
CTP QC/QA: YES
MOLECULAR STREP A: NEGATIVE
SARS-COV-2 AG RESP QL IA.RAPID: NEGATIVE

## 2023-09-16 PROCEDURE — 99213 PR OFFICE/OUTPT VISIT, EST, LEVL III, 20-29 MIN: ICD-10-PCS | Mod: S$GLB,,, | Performed by: NURSE PRACTITIONER

## 2023-09-16 PROCEDURE — 99213 OFFICE O/P EST LOW 20 MIN: CPT | Mod: S$GLB,,, | Performed by: NURSE PRACTITIONER

## 2023-09-16 PROCEDURE — 87651 POCT STREP A MOLECULAR: ICD-10-PCS | Mod: QW,S$GLB,, | Performed by: NURSE PRACTITIONER

## 2023-09-16 PROCEDURE — 87811 SARS-COV-2 COVID19 W/OPTIC: CPT | Mod: QW,S$GLB,, | Performed by: NURSE PRACTITIONER

## 2023-09-16 PROCEDURE — 87811 SARS CORONAVIRUS 2 ANTIGEN POCT, MANUAL READ: ICD-10-PCS | Mod: QW,S$GLB,, | Performed by: NURSE PRACTITIONER

## 2023-09-16 PROCEDURE — 87651 STREP A DNA AMP PROBE: CPT | Mod: QW,S$GLB,, | Performed by: NURSE PRACTITIONER

## 2023-09-16 RX ORDER — METHYLPREDNISOLONE 4 MG/1
TABLET ORAL
Qty: 21 EACH | Refills: 0 | Status: SHIPPED | OUTPATIENT
Start: 2023-09-16 | End: 2023-10-07

## 2023-09-16 NOTE — PROGRESS NOTES
"Subjective:      Patient ID: Sukumar Rothman is a 23 y.o. male.    Vitals:  height is 6' 3" (1.905 m) and weight is 174.2 kg (384 lb) (abnormal). His oral temperature is 98 °F (36.7 °C). His blood pressure is 123/81 and his pulse is 78. His respiration is 18 and oxygen saturation is 95%.     Chief Complaint: Sore Throat    Pt present to clinic with sore throat, swollen gland. Symptoms started today. Pain 03/10    Sore Throat   This is a new problem. The current episode started today. The problem has been gradually worsening. Sore throat worse side: center. There has been no fever. The pain is at a severity of 3/10. The pain is mild. Associated symptoms include swollen glands and trouble swallowing. Pertinent negatives include no congestion, coughing or headaches. He has had no exposure to strep or mono. He has tried nothing for the symptoms. The treatment provided no relief.     Constitution: Negative. Negative for fatigue and fever.   HENT:  Positive for sore throat and trouble swallowing. Negative for congestion.    Neck: neck negative. Negative for neck stiffness.   Cardiovascular: Negative.  Negative for palpitations.   Respiratory:  Negative for cough.    Neurological:  Negative for headaches.      Objective:     Physical Exam   HENT:   Head: Normocephalic.   Nose: Nose normal.   Mouth/Throat: Mucous membranes are moist. Uvula swelling present. Posterior oropharyngeal erythema present.   Pulmonary/Chest: Effort normal and breath sounds normal.   Abdominal: Normal appearance.   Neurological: He is alert.   Skin: Skin is warm and dry.       Assessment:     1. Uvulitis        Plan:   Ms. Rothman presents for sore throat. States upon waking he had difficulty swallowing. States when looking in the mirror he notice his uvala swollen. Denies any known allergies. Denies runny nose, ear pain, facial pressure, cough or SOB.    Uvulitis  -     SARS Coronavirus 2 Antigen, POCT Manual Read  -     POCT Strep A, Molecular  -  "    methylPREDNISolone (MEDROL DOSEPACK) 4 mg tablet; use as directed  Dispense: 21 each; Refill: 0      Results for orders placed or performed in visit on 09/16/23   SARS Coronavirus 2 Antigen, POCT Manual Read   Result Value Ref Range    SARS Coronavirus 2 Antigen Negative Negative     Acceptable Yes    POCT Strep A, Molecular   Result Value Ref Range    Molecular Strep A, POC Negative Negative     Acceptable Yes        Patient Instructions   Get plenty of rest  Gargle with warm salt water  Increase fluids  If difficulty swallowing worsen or have trouble breathing go to ER

## 2023-09-16 NOTE — PATIENT INSTRUCTIONS
Get plenty of rest  Gargle with warm salt water  Increase fluids  If difficulty swallowing worsen or have trouble breathing go to ER

## 2024-01-18 ENCOUNTER — HOSPITAL ENCOUNTER (INPATIENT)
Facility: HOSPITAL | Age: 24
LOS: 1 days | Discharge: HOME | DRG: 309 | End: 2024-01-19
Attending: EMERGENCY MEDICINE | Admitting: INTERNAL MEDICINE
Payer: COMMERCIAL

## 2024-01-18 DIAGNOSIS — I48.91 ATRIAL FIBRILLATION WITH RAPID VENTRICULAR RESPONSE: Primary | ICD-10-CM

## 2024-01-18 DIAGNOSIS — I48.0 PAROXYSMAL ATRIAL FIBRILLATION: ICD-10-CM

## 2024-01-18 DIAGNOSIS — R07.9 CHEST PAIN: ICD-10-CM

## 2024-01-18 DIAGNOSIS — I48.91 ATRIAL FIBRILLATION: ICD-10-CM

## 2024-01-18 DIAGNOSIS — R00.2 PALPITATIONS: ICD-10-CM

## 2024-01-18 LAB
ALBUMIN SERPL BCP-MCNC: 3.7 G/DL (ref 3.5–5.2)
ALP SERPL-CCNC: 103 U/L (ref 55–135)
ALT SERPL W/O P-5'-P-CCNC: 39 U/L (ref 10–44)
ANION GAP SERPL CALC-SCNC: 14 MMOL/L (ref 8–16)
AST SERPL-CCNC: 23 U/L (ref 10–40)
BASOPHILS # BLD AUTO: 0.05 K/UL (ref 0–0.2)
BASOPHILS NFR BLD: 0.5 % (ref 0–1.9)
BILIRUB SERPL-MCNC: 0.3 MG/DL (ref 0.1–1)
BNP SERPL-MCNC: 266 PG/ML (ref 0–99)
BUN SERPL-MCNC: 15 MG/DL (ref 6–20)
CALCIUM SERPL-MCNC: 9.3 MG/DL (ref 8.7–10.5)
CHLORIDE SERPL-SCNC: 107 MMOL/L (ref 95–110)
CO2 SERPL-SCNC: 19 MMOL/L (ref 23–29)
CREAT SERPL-MCNC: 0.9 MG/DL (ref 0.5–1.4)
DIFFERENTIAL METHOD BLD: ABNORMAL
EOSINOPHIL # BLD AUTO: 0.1 K/UL (ref 0–0.5)
EOSINOPHIL NFR BLD: 1.2 % (ref 0–8)
ERYTHROCYTE [DISTWIDTH] IN BLOOD BY AUTOMATED COUNT: 13.5 % (ref 11.5–14.5)
EST. GFR  (NO RACE VARIABLE): >60 ML/MIN/1.73 M^2
GLUCOSE SERPL-MCNC: 109 MG/DL (ref 70–110)
HCT VFR BLD AUTO: 39.2 % (ref 40–54)
HGB BLD-MCNC: 13.4 G/DL (ref 14–18)
IMM GRANULOCYTES # BLD AUTO: 0.02 K/UL (ref 0–0.04)
IMM GRANULOCYTES NFR BLD AUTO: 0.2 % (ref 0–0.5)
LYMPHOCYTES # BLD AUTO: 3.7 K/UL (ref 1–4.8)
LYMPHOCYTES NFR BLD: 35.7 % (ref 18–48)
MAGNESIUM SERPL-MCNC: 1.8 MG/DL (ref 1.6–2.6)
MCH RBC QN AUTO: 27.7 PG (ref 27–31)
MCHC RBC AUTO-ENTMCNC: 34.2 G/DL (ref 32–36)
MCV RBC AUTO: 81 FL (ref 82–98)
MONOCYTES # BLD AUTO: 1 K/UL (ref 0.3–1)
MONOCYTES NFR BLD: 9.7 % (ref 4–15)
NEUTROPHILS # BLD AUTO: 5.4 K/UL (ref 1.8–7.7)
NEUTROPHILS NFR BLD: 52.7 % (ref 38–73)
NRBC BLD-RTO: 0 /100 WBC
PHOSPHATE SERPL-MCNC: 4.1 MG/DL (ref 2.7–4.5)
PLATELET # BLD AUTO: 259 K/UL (ref 150–450)
PMV BLD AUTO: 8.8 FL (ref 9.2–12.9)
POTASSIUM SERPL-SCNC: 4.1 MMOL/L (ref 3.5–5.1)
PROT SERPL-MCNC: 7.1 G/DL (ref 6–8.4)
RBC # BLD AUTO: 4.83 M/UL (ref 4.6–6.2)
SODIUM SERPL-SCNC: 140 MMOL/L (ref 136–145)
TROPONIN I SERPL DL<=0.01 NG/ML-MCNC: 0.03 NG/ML (ref 0–0.03)
WBC # BLD AUTO: 10.25 K/UL (ref 3.9–12.7)

## 2024-01-18 PROCEDURE — 83880 ASSAY OF NATRIURETIC PEPTIDE: CPT

## 2024-01-18 PROCEDURE — 83735 ASSAY OF MAGNESIUM: CPT

## 2024-01-18 PROCEDURE — 93010 ELECTROCARDIOGRAM REPORT: CPT | Mod: ,,, | Performed by: INTERNAL MEDICINE

## 2024-01-18 PROCEDURE — 84100 ASSAY OF PHOSPHORUS: CPT

## 2024-01-18 PROCEDURE — 93005 ELECTROCARDIOGRAM TRACING: CPT

## 2024-01-18 PROCEDURE — 85025 COMPLETE CBC W/AUTO DIFF WBC: CPT

## 2024-01-18 PROCEDURE — 84484 ASSAY OF TROPONIN QUANT: CPT

## 2024-01-18 PROCEDURE — 80053 COMPREHEN METABOLIC PANEL: CPT

## 2024-01-18 PROCEDURE — 96374 THER/PROPH/DIAG INJ IV PUSH: CPT

## 2024-01-18 PROCEDURE — 12000002 HC ACUTE/MED SURGE SEMI-PRIVATE ROOM

## 2024-01-18 PROCEDURE — 93010 ELECTROCARDIOGRAM REPORT: CPT | Mod: 76,,, | Performed by: INTERNAL MEDICINE

## 2024-01-18 PROCEDURE — 96376 TX/PRO/DX INJ SAME DRUG ADON: CPT

## 2024-01-18 PROCEDURE — 99291 CRITICAL CARE FIRST HOUR: CPT

## 2024-01-18 PROCEDURE — 25000003 PHARM REV CODE 250: Performed by: EMERGENCY MEDICINE

## 2024-01-18 RX ORDER — TALC
6 POWDER (GRAM) TOPICAL NIGHTLY PRN
Status: DISCONTINUED | OUTPATIENT
Start: 2024-01-19 | End: 2024-01-19 | Stop reason: HOSPADM

## 2024-01-18 RX ORDER — ACETAMINOPHEN 325 MG/1
650 TABLET ORAL EVERY 8 HOURS PRN
Status: DISCONTINUED | OUTPATIENT
Start: 2024-01-19 | End: 2024-01-19 | Stop reason: HOSPADM

## 2024-01-18 RX ORDER — GLUCAGON 1 MG
1 KIT INJECTION
Status: DISCONTINUED | OUTPATIENT
Start: 2024-01-19 | End: 2024-01-19 | Stop reason: HOSPADM

## 2024-01-18 RX ORDER — DILTIAZEM HYDROCHLORIDE 5 MG/ML
25 INJECTION INTRAVENOUS ONCE
Status: COMPLETED | OUTPATIENT
Start: 2024-01-18 | End: 2024-01-18

## 2024-01-18 RX ORDER — NALOXONE HCL 0.4 MG/ML
0.02 VIAL (ML) INJECTION
Status: DISCONTINUED | OUTPATIENT
Start: 2024-01-19 | End: 2024-01-19 | Stop reason: HOSPADM

## 2024-01-18 RX ORDER — SODIUM CHLORIDE 0.9 % (FLUSH) 0.9 %
10 SYRINGE (ML) INJECTION EVERY 12 HOURS PRN
Status: DISCONTINUED | OUTPATIENT
Start: 2024-01-19 | End: 2024-01-19 | Stop reason: HOSPADM

## 2024-01-18 RX ORDER — IBUPROFEN 200 MG
16 TABLET ORAL
Status: DISCONTINUED | OUTPATIENT
Start: 2024-01-19 | End: 2024-01-19 | Stop reason: HOSPADM

## 2024-01-18 RX ORDER — HEPARIN SODIUM 5000 [USP'U]/ML
5000 INJECTION, SOLUTION INTRAVENOUS; SUBCUTANEOUS EVERY 8 HOURS
Status: DISCONTINUED | OUTPATIENT
Start: 2024-01-19 | End: 2024-01-19

## 2024-01-18 RX ORDER — DILTIAZEM HCL 1 MG/ML
0-15 INJECTION, SOLUTION INTRAVENOUS
Status: COMPLETED | OUTPATIENT
Start: 2024-01-18 | End: 2024-01-19

## 2024-01-18 RX ORDER — IBUPROFEN 200 MG
24 TABLET ORAL
Status: DISCONTINUED | OUTPATIENT
Start: 2024-01-19 | End: 2024-01-19 | Stop reason: HOSPADM

## 2024-01-18 RX ORDER — SIMETHICONE 80 MG
1 TABLET,CHEWABLE ORAL 4 TIMES DAILY PRN
Status: DISCONTINUED | OUTPATIENT
Start: 2024-01-19 | End: 2024-01-19 | Stop reason: HOSPADM

## 2024-01-18 RX ADMIN — DILTIAZEM HYDROCHLORIDE 25 MG: 5 INJECTION INTRAVENOUS at 08:01

## 2024-01-18 RX ADMIN — Medication 5 MG/HR: at 09:01

## 2024-01-18 NOTE — Clinical Note
Pt converted to NSR after anesthesia began, procedure completed at this time.  No Ronnie or cardioversion at this time.

## 2024-01-19 ENCOUNTER — ANESTHESIA EVENT (OUTPATIENT)
Dept: CARDIOLOGY | Facility: HOSPITAL | Age: 24
DRG: 309 | End: 2024-01-19
Payer: COMMERCIAL

## 2024-01-19 ENCOUNTER — ANESTHESIA (OUTPATIENT)
Dept: CARDIOLOGY | Facility: HOSPITAL | Age: 24
DRG: 309 | End: 2024-01-19
Payer: COMMERCIAL

## 2024-01-19 VITALS
HEIGHT: 75 IN | OXYGEN SATURATION: 96 % | BODY MASS INDEX: 39.17 KG/M2 | RESPIRATION RATE: 21 BRPM | DIASTOLIC BLOOD PRESSURE: 75 MMHG | HEART RATE: 75 BPM | WEIGHT: 315 LBS | TEMPERATURE: 98 F | SYSTOLIC BLOOD PRESSURE: 121 MMHG

## 2024-01-19 PROBLEM — E66.01 SEVERE OBESITY (BMI >= 40): Status: ACTIVE | Noted: 2024-01-19

## 2024-01-19 PROBLEM — I48.91 ATRIAL FIBRILLATION: Chronic | Status: ACTIVE | Noted: 2024-01-19

## 2024-01-19 PROBLEM — I48.91 ATRIAL FIBRILLATION: Status: ACTIVE | Noted: 2024-01-19

## 2024-01-19 LAB
ASCENDING AORTA: 3.02 CM
AV INDEX (PROSTH): 0.68
AV MEAN GRADIENT: 2 MMHG
AV PEAK GRADIENT: 4 MMHG
AV VALVE AREA BY VELOCITY RATIO: 2.94 CM²
AV VALVE AREA: 2.92 CM²
AV VELOCITY RATIO: 0.68
BSA FOR ECHO PROCEDURE: 2.86 M2
CV ECHO LV RWT: 0.37 CM
DOP CALC AO PEAK VEL: 0.95 M/S
DOP CALC AO VTI: 20 CM
DOP CALC LVOT AREA: 4.3 CM2
DOP CALC LVOT DIAMETER: 2.34 CM
DOP CALC LVOT PEAK VEL: 0.65 M/S
DOP CALC LVOT STROKE VOLUME: 58.46 CM3
DOP CALCLVOT PEAK VEL VTI: 13.6 CM
E WAVE DECELERATION TIME: 188.55 MSEC
E/A RATIO: 1.91
E/E' RATIO: 5.25 M/S
ECHO LV POSTERIOR WALL: 0.93 CM (ref 0.6–1.1)
EJECTION FRACTION: 55 %
FRACTIONAL SHORTENING: 27 % (ref 28–44)
INTERVENTRICULAR SEPTUM: 0.87 CM (ref 0.6–1.1)
IVRT: 99.9 MSEC
LA MAJOR: 5.28 CM
LA MINOR: 5.5 CM
LA WIDTH: 5 CM
LEFT ATRIUM SIZE: 4.2 CM
LEFT ATRIUM VOLUME INDEX MOD: 21.6 ML/M2
LEFT ATRIUM VOLUME INDEX: 35 ML/M2
LEFT ATRIUM VOLUME MOD: 59.45 CM3
LEFT ATRIUM VOLUME: 96.17 CM3
LEFT INTERNAL DIMENSION IN SYSTOLE: 3.62 CM (ref 2.1–4)
LEFT VENTRICLE DIASTOLIC VOLUME INDEX: 42.6 ML/M2
LEFT VENTRICLE DIASTOLIC VOLUME: 117.15 ML
LEFT VENTRICLE MASS INDEX: 57 G/M2
LEFT VENTRICLE SYSTOLIC VOLUME INDEX: 20.1 ML/M2
LEFT VENTRICLE SYSTOLIC VOLUME: 55.2 ML
LEFT VENTRICULAR INTERNAL DIMENSION IN DIASTOLE: 4.98 CM (ref 3.5–6)
LEFT VENTRICULAR MASS: 157.15 G
LV LATERAL E/E' RATIO: 5.25 M/S
LV SEPTAL E/E' RATIO: 5.25 M/S
LVOT MG: 1.16 MMHG
LVOT MV: 0.52 CM/S
MV PEAK A VEL: 0.33 M/S
MV PEAK E VEL: 0.63 M/S
MV STENOSIS PRESSURE HALF TIME: 54.68 MS
MV VALVE AREA P 1/2 METHOD: 4.02 CM2
PISA TR MAX VEL: 2.24 M/S
PULM VEIN S/D RATIO: 1.04
PV PEAK D VEL: 0.23 M/S
PV PEAK S VEL: 0.24 M/S
RA MAJOR: 5.24 CM
RA PRESSURE ESTIMATED: 3 MMHG
RA WIDTH: 4.79 CM
RIGHT VENTRICULAR END-DIASTOLIC DIMENSION: 3.24 CM
RV TB RVSP: 5 MMHG
RV TISSUE DOPPLER FREE WALL SYSTOLIC VELOCITY 1 (APICAL 4 CHAMBER VIEW): 9.48 CM/S
SINUS: 3.49 CM
STJ: 3.29 CM
TDI LATERAL: 0.12 M/S
TDI SEPTAL: 0.12 M/S
TDI: 0.12 M/S
TR MAX PG: 20 MMHG
TRICUSPID ANNULAR PLANE SYSTOLIC EXCURSION: 2.27 CM
TSH SERPL DL<=0.005 MIU/L-ACNC: 1.42 UIU/ML (ref 0.4–4)
TV REST PULMONARY ARTERY PRESSURE: 23 MMHG
Z-SCORE OF LEFT VENTRICULAR DIMENSION IN END DIASTOLE: -18.17
Z-SCORE OF LEFT VENTRICULAR DIMENSION IN END SYSTOLE: -12.53

## 2024-01-19 PROCEDURE — 99499 UNLISTED E&M SERVICE: CPT | Mod: ,,, | Performed by: STUDENT IN AN ORGANIZED HEALTH CARE EDUCATION/TRAINING PROGRAM

## 2024-01-19 PROCEDURE — 25000003 PHARM REV CODE 250: Performed by: INTERNAL MEDICINE

## 2024-01-19 PROCEDURE — D9220A PRA ANESTHESIA: Mod: ANES,,, | Performed by: STUDENT IN AN ORGANIZED HEALTH CARE EDUCATION/TRAINING PROGRAM

## 2024-01-19 PROCEDURE — 63600175 PHARM REV CODE 636 W HCPCS: Performed by: STUDENT IN AN ORGANIZED HEALTH CARE EDUCATION/TRAINING PROGRAM

## 2024-01-19 PROCEDURE — 99223 1ST HOSP IP/OBS HIGH 75: CPT | Mod: 25,,, | Performed by: NURSE PRACTITIONER

## 2024-01-19 PROCEDURE — D9220A PRA ANESTHESIA: Mod: CRNA,,, | Performed by: NURSE ANESTHETIST, CERTIFIED REGISTERED

## 2024-01-19 PROCEDURE — 37000009 HC ANESTHESIA EA ADD 15 MINS: Performed by: STUDENT IN AN ORGANIZED HEALTH CARE EDUCATION/TRAINING PROGRAM

## 2024-01-19 PROCEDURE — 84443 ASSAY THYROID STIM HORMONE: CPT | Performed by: STUDENT IN AN ORGANIZED HEALTH CARE EDUCATION/TRAINING PROGRAM

## 2024-01-19 PROCEDURE — 25000003 PHARM REV CODE 250: Performed by: NURSE ANESTHETIST, CERTIFIED REGISTERED

## 2024-01-19 PROCEDURE — 37000008 HC ANESTHESIA 1ST 15 MINUTES: Performed by: STUDENT IN AN ORGANIZED HEALTH CARE EDUCATION/TRAINING PROGRAM

## 2024-01-19 PROCEDURE — 63600175 PHARM REV CODE 636 W HCPCS: Performed by: NURSE ANESTHETIST, CERTIFIED REGISTERED

## 2024-01-19 PROCEDURE — 25000003 PHARM REV CODE 250: Performed by: STUDENT IN AN ORGANIZED HEALTH CARE EDUCATION/TRAINING PROGRAM

## 2024-01-19 RX ORDER — METOPROLOL SUCCINATE 25 MG/1
25 TABLET, EXTENDED RELEASE ORAL DAILY
Qty: 30 TABLET | Refills: 2 | Status: SHIPPED | OUTPATIENT
Start: 2024-01-20 | End: 2024-01-19

## 2024-01-19 RX ORDER — HEPARIN SODIUM,PORCINE/D5W 25000/250
0-40 INTRAVENOUS SOLUTION INTRAVENOUS CONTINUOUS
Status: DISCONTINUED | OUTPATIENT
Start: 2024-01-19 | End: 2024-01-19

## 2024-01-19 RX ORDER — METOPROLOL SUCCINATE 25 MG/1
25 TABLET, EXTENDED RELEASE ORAL DAILY
Qty: 30 TABLET | Refills: 2 | Status: SHIPPED | OUTPATIENT
Start: 2024-01-20 | End: 2024-04-17 | Stop reason: SDUPTHER

## 2024-01-19 RX ORDER — LIDOCAINE HYDROCHLORIDE 20 MG/ML
INJECTION INTRAVENOUS
Status: DISCONTINUED | OUTPATIENT
Start: 2024-01-19 | End: 2024-01-19

## 2024-01-19 RX ORDER — METOPROLOL SUCCINATE 25 MG/1
25 TABLET, EXTENDED RELEASE ORAL DAILY
Status: DISCONTINUED | OUTPATIENT
Start: 2024-01-19 | End: 2024-01-19 | Stop reason: HOSPADM

## 2024-01-19 RX ORDER — METOPROLOL TARTRATE 25 MG/1
25 TABLET, FILM COATED ORAL DAILY
Status: DISCONTINUED | OUTPATIENT
Start: 2024-01-19 | End: 2024-01-19

## 2024-01-19 RX ORDER — PROPOFOL 10 MG/ML
VIAL (ML) INTRAVENOUS
Status: DISCONTINUED | OUTPATIENT
Start: 2024-01-19 | End: 2024-01-19

## 2024-01-19 RX ORDER — DILTIAZEM HCL 1 MG/ML
0-15 INJECTION, SOLUTION INTRAVENOUS CONTINUOUS
Status: DISCONTINUED | OUTPATIENT
Start: 2024-01-19 | End: 2024-01-19

## 2024-01-19 RX ADMIN — METOPROLOL SUCCINATE 25 MG: 25 TABLET, EXTENDED RELEASE ORAL at 12:01

## 2024-01-19 RX ADMIN — AMIODARONE HYDROCHLORIDE 1 MG/MIN: 1.8 INJECTION, SOLUTION INTRAVENOUS at 12:01

## 2024-01-19 RX ADMIN — AMIODARONE HYDROCHLORIDE 150 MG: 1.5 INJECTION, SOLUTION INTRAVENOUS at 12:01

## 2024-01-19 RX ADMIN — LIDOCAINE HYDROCHLORIDE 75 MG: 20 INJECTION, SOLUTION INTRAVENOUS at 10:01

## 2024-01-19 RX ADMIN — Medication 50 MG: at 10:01

## 2024-01-19 RX ADMIN — SODIUM CHLORIDE, SODIUM LACTATE, POTASSIUM CHLORIDE, AND CALCIUM CHLORIDE: .6; .31; .03; .02 INJECTION, SOLUTION INTRAVENOUS at 10:01

## 2024-01-19 RX ADMIN — HEPARIN SODIUM 5000 UNITS: 5000 INJECTION INTRAVENOUS; SUBCUTANEOUS at 06:01

## 2024-01-19 RX ADMIN — Medication 100 MG: at 10:01

## 2024-01-19 RX ADMIN — Medication 15 MG/HR: at 06:01

## 2024-01-19 NOTE — ED NOTES
Per verbal order from Dr. Mcarthur, continue infusions as ordered. Cardiology will consult in AM to determine next course of action. Pt continues to deny chest pain, SOB, and dizziness.

## 2024-01-19 NOTE — H&P
[facility] Medicine  History & Physical    Patient Name: Sukumar Rothman  MRN: [id number]  Patient Class: IP- Inpatient  Admission Date: 1/18/2024  Attending Physician: Yanna Cobian MD   Primary Care Provider: Susy Primary Doctor         Patient information was obtained from patient and ER records.     Subjective:     Principal Problem:Atrial fibrillation    Chief Complaint:   Chief Complaint   Patient presents with    Chest Pain     Complaining of chest tightness since yesterday.  States Apple watch alerted him to irregular heart beat.        HPI: Raulito Rothman is a 23-year-old male with a past medical history of obesity who presents with palpitations.    Patient states that over the past day he has had episodes of palpitations that spontaneously resolved.  He also noticed on his Apple watch they may have had an abnormal rhythm.  Reports a family history of atrial fibrillation age 30.  Presents for further evaluation.    Triage vitals was fairly unremarkable.  Physical exam significant for obesity and an abnormal rhythm.    CBC was significant for microcytic anemia.  CMP is fairly unremarkable.    EKG was significant for atrial fibrillation with RVR    Cardiology was consulted in the ED with the patient was started on diltiazem drip.  Unfortunately pressures and rhythm were poorly controlled and patient was started on amiodarone infusion.    Cardiology will see patient with possible cardioversion in the morning.    Medicine consulted for further care.    No past medical history on file.    No past surgical history on file.    Review of patient's allergies indicates:  No Known Allergies    No current facility-administered medications on file prior to encounter.     Current Outpatient Medications on File Prior to Encounter   Medication Sig    acetaminophen (TYLENOL) 500 MG tablet Take 2 tablets (1,000 mg total) by mouth every 6 (six) hours as needed.    ergocalciferol (ERGOCALCIFEROL)  50,000 unit Cap Take 1 capsule (50,000 Units total) by mouth every 7 days. (Patient not taking: Reported on 9/16/2023)    ibuprofen (ADVIL,MOTRIN) 600 MG tablet Take 1 tablet (600 mg total) by mouth every 6 (six) hours as needed for Pain.    ondansetron (ZOFRAN-ODT) 4 MG TbDL Take 1 tablet (4 mg total) by mouth every 6 (six) hours as needed (nausea).     Family History       Problem Relation (Age of Onset)    No Known Problems Mother, Father          Tobacco Use    Smoking status: Every Day     Types: Vaping with nicotine    Smokeless tobacco: Never   Substance and Sexual Activity    Alcohol use: Not on file    Drug use: Never    Sexual activity: Yes     Review of Systems   Constitutional:  Negative for activity change and appetite change.   HENT:  Negative for ear discharge and ear pain.    Eyes:  Negative for pain and redness.   Respiratory:  Negative for chest tightness.    Cardiovascular:  Negative for chest pain.   Gastrointestinal:  Negative for anal bleeding and blood in stool.   Endocrine: Negative for polydipsia and polyphagia.   Genitourinary:  Negative for frequency and genital sores.   Musculoskeletal:  Negative for gait problem and joint swelling.   Skin:  Negative for pallor and rash.   Allergic/Immunologic: Negative for environmental allergies and food allergies.   Neurological:  Negative for seizures and numbness.   Hematological:  Negative for adenopathy. Does not bruise/bleed easily.   Psychiatric/Behavioral:  Negative for confusion and decreased concentration.      Objective:     Vital Signs (Most Recent):  Temp: 98.1 °F (36.7 °C) (01/19/24 0636)  Pulse: (!) 135 (01/19/24 0636)  Resp: 16 (01/19/24 0636)  BP: 112/70 (01/19/24 0603)  SpO2: 97 % (01/19/24 0636) Vital Signs (24h Range):  Temp:  [98.1 °F (36.7 °C)-98.2 °F (36.8 °C)] 98.1 °F (36.7 °C)  Pulse:  [] 135  Resp:  [11-26] 16  SpO2:  [94 %-97 %] 97 %  BP: ()/(56-96) 112/70     Weight: (!) 154.2 kg (340 lb)  Body mass index is 42.5  "kg/m².     Physical Exam  Constitutional:       General: He is not in acute distress.     Appearance: Normal appearance. He is not ill-appearing.   HENT:      Head: Normocephalic and atraumatic.      Right Ear: There is no impacted cerumen.      Left Ear: Tympanic membrane normal. There is no impacted cerumen.      Nose: No congestion or rhinorrhea.      Mouth/Throat:      Pharynx: No oropharyngeal exudate or posterior oropharyngeal erythema.   Eyes:      General:         Right eye: No discharge.         Left eye: No discharge.   Cardiovascular:      Rate and Rhythm: Tachycardia present. Rhythm irregular.      Heart sounds: No murmur heard.     No gallop.   Abdominal:      Tenderness: There is no abdominal tenderness. There is no guarding or rebound.      Hernia: No hernia is present.   Musculoskeletal:         General: No deformity.      Cervical back: No rigidity.      Right lower leg: No edema.   Lymphadenopathy:      Cervical: No cervical adenopathy.   Skin:     Findings: No bruising or lesion.   Neurological:      Mental Status: He is alert.      Motor: No weakness.      Gait: Gait normal.   Psychiatric:         Mood and Affect: Mood normal.         Behavior: Behavior normal.                Significant Labs: All pertinent labs within the past 24 hours have been reviewed.  Bilirubin:   Recent Labs   Lab 01/18/24 2045   BILITOT 0.3     Blood Culture: No results for input(s): "LABBLOO" in the last 48 hours.  BMP:   Recent Labs   Lab 01/18/24 2045         K 4.1      CO2 19*   BUN 15   CREATININE 0.9   CALCIUM 9.3   MG 1.8     CMP:   Recent Labs   Lab 01/18/24 2045      K 4.1      CO2 19*      BUN 15   CREATININE 0.9   CALCIUM 9.3   PROT 7.1   ALBUMIN 3.7   BILITOT 0.3   ALKPHOS 103   AST 23   ALT 39   ANIONGAP 14     Cardiac Markers:   Recent Labs   Lab 01/18/24 2045   *     Lactic Acid: No results for input(s): "LACTATE" in the last 48 hours.  Lipid Panel: No " "results for input(s): "CHOL", "HDL", "LDLCALC", "TRIG", "CHOLHDL" in the last 48 hours.  Magnesium:   Recent Labs   Lab 01/18/24 2045   MG 1.8     Prealbumin: No results for input(s): "PREALBUMIN" in the last 48 hours.  Respiratory Culture: No results for input(s): "GSRESP", "RESPIRATORYC" in the last 48 hours.  Troponin:   Recent Labs   Lab 01/18/24 2045   TROPONINI 0.025     TSH: No results for input(s): "TSH" in the last 4320 hours.    Significant Imaging: I have reviewed all pertinent imaging results/findings within the past 24 hours.  I have reviewed and interpreted all pertinent imaging results/findings within the past 24 hours.  Assessment/Plan:     * Atrial fibrillation  Patient with Paroxysmal (<7 days) atrial fibrillation which is uncontrolled currently with Amiodarone and Digoxin. Patient is currently in atrial fibrillation.[id number] Score: The patient doesn't have any registry metric data available. HASBLED Score: 0. Anticoagulation not indicated due to low Chadvasc score .    Severe obesity (BMI >= 40)  Body mass index is 42.5 kg/m². Morbid obesity complicates all aspects of disease management from diagnostic modalities to treatment. Weight loss encouraged and health benefits explained to patient.           VTE Risk Mitigation (From admission, onward)           Ordered     IP VTE HIGH RISK PATIENT  Once         01/18/24 2341     Place sequential compression device  Until discontinued         01/18/24 2341                                    Raad Mcarthur MD  Department of Hospital Medicine  [place] - Emergency Dept          "

## 2024-01-19 NOTE — ED NOTES
"Patient awake and alert, denies chest pain, SOB. Reports he is having heart palpitations "here and there" but that they have not been as often as before he started the IV drips. Spoke with Dr. Cobian via telephone. IV amiodarone order has been discontinued, and drip stopped and IV diltiazem continuing to infuse at max rate of 15 mg/hr. Spoke with Dr. Juarez to confirm that cardiology will be coming down to see patient. Patient states he feels fine other than being hungry and ready to get a room. Informed patient that he must remain NPO until cleared by cardiology and that we are working on getting him a bed upstairs as soon as possible.   "

## 2024-01-19 NOTE — ED NOTES
Did not decrease amiodarone rate per order. Pt remains in a fib w/ heart rate in 140s. Secure chat sent to physician.

## 2024-01-19 NOTE — TRANSFER OF CARE
"Anesthesia Transfer of Care Note    Patient: Sukumar Rothman    Procedure(s) Performed: Procedure(s) (LRB):  Transesophageal echo (DANETTE) intra-procedure log documentation (N/A)    Patient location: Cath Lab    Anesthesia Type: general    Transport from OR: Transported from OR on 6-10 L/min O2 by face mask with adequate spontaneous ventilation    Post pain: adequate analgesia    Post assessment: no apparent anesthetic complications and tolerated procedure well    Post vital signs: stable    Level of consciousness: awake, alert and oriented    Nausea/Vomiting: no nausea/vomiting    Complications: none    Transfer of care protocol was followed      Last vitals: Visit Vitals  BP (!) 157/71   Pulse 97   Temp 36.7 °C (98.1 °F) (Oral)   Resp 11   Ht 6' 3" (1.905 m)   Wt (!) 154.2 kg (340 lb)   SpO2 96%   BMI 42.50 kg/m²     "

## 2024-01-19 NOTE — ED PROVIDER NOTES
Encounter Date: 1/18/2024       History     Chief Complaint   Patient presents with    Chest Pain     Complaining of chest tightness since yesterday.  States Apple watch alerted him to irregular heart beat.     23-year-old male with no known past medical history presents with complaint of palpitations.  Patient says that yesterday he noticed palpitations that spontaneously resolved.  He says that around 6:00 p.m. today, he notes a similar sensation in his Apple watch alerted him that he was in a regular rhythm.  He said he spoke with his dad who told him that he was diagnosed with atrial fibrillation at age 28.  Patient denies any recent fever, chills, chest pain, shortness of breath, vomiting, diarrhea.  He says that he quit vaping about 1 week ago.  Admits to alcohol use on the weekends only.  Denies any other drug use.  Says that he drinks a lot of soda but no recent increase in intake.    The history is provided by the patient.     Review of patient's allergies indicates:  No Known Allergies  No past medical history on file.  No past surgical history on file.  Family History   Problem Relation Age of Onset    No Known Problems Mother     No Known Problems Father      Social History     Tobacco Use    Smoking status: Every Day     Types: Vaping with nicotine    Smokeless tobacco: Never   Substance Use Topics    Drug use: Never     Review of Systems    Physical Exam     Initial Vitals [01/18/24 1943]   BP Pulse Resp Temp SpO2   110/66 84 18 98.2 °F (36.8 °C) 97 %      MAP       --         Physical Exam    Constitutional: He appears well-developed and well-nourished. He is not diaphoretic. No distress.   HENT:   Head: Normocephalic and atraumatic.   Eyes: Conjunctivae and EOM are normal.   Neck: Neck supple.   Normal range of motion.  Cardiovascular:            Tachycardic, irregular rhythm   Pulmonary/Chest: Breath sounds normal. No respiratory distress. He has no wheezes.   Abdominal: Abdomen is soft. He exhibits  no distension. There is no abdominal tenderness.   Musculoskeletal:         General: Normal range of motion.      Cervical back: Normal range of motion and neck supple.     Neurological: He is alert and oriented to person, place, and time.   Skin: Skin is warm and dry.   Psychiatric: He has a normal mood and affect.         ED Course   Critical Care    Date/Time: 1/18/2024 10:19 PM    Performed by: Minal Montana MD  Authorized by: Minal Montana MD  Total critical care time (exclusive of procedural time) : 45 minutes  Critical care time was exclusive of separately billable procedures and treating other patients.  Critical care was necessary to treat or prevent imminent or life-threatening deterioration of the following conditions: cardiac failure.  Critical care was time spent personally by me on the following activities: development of treatment plan with patient or surrogate, interpretation of cardiac output measurements, evaluation of patient's response to treatment, examination of patient, obtaining history from patient or surrogate, ordering and performing treatments and interventions, ordering and review of laboratory studies, ordering and review of radiographic studies, pulse oximetry, re-evaluation of patient's condition, review of old charts and discussions with consultants.        Labs Reviewed   B-TYPE NATRIURETIC PEPTIDE - Abnormal; Notable for the following components:       Result Value     (*)     All other components within normal limits   CBC W/ AUTO DIFFERENTIAL - Abnormal; Notable for the following components:    Hemoglobin 13.4 (*)     Hematocrit 39.2 (*)     MCV 81 (*)     MPV 8.8 (*)     All other components within normal limits   COMPREHENSIVE METABOLIC PANEL - Abnormal; Notable for the following components:    CO2 19 (*)     All other components within normal limits   TROPONIN I   MAGNESIUM   MAGNESIUM   PHOSPHORUS   MAGNESIUM   PHOSPHORUS     EKG Readings:  (Independently Interpreted)   Initial Reading: No STEMI. Previous EKG: Compared with most recent EKG Rhythm: Atrial Fibrillation. Heart Rate: 146. Axis: Normal. Clinical Impression: Atrial Fibrillation with RVR       Imaging Results    None          Medications   diltiaZEM injection 25 mg (25 mg Intravenous Given 1/18/24 2055)   diltiaZEM 125 mg in D5W 125 mL infusion (5 mg/hr Intravenous New Bag 1/18/24 2153)     Medical Decision Making  23-year-old male with past medical history as above presents with complaint of palpitations, found to be in AFib with RVR.  He is hemodynamically stable.  Differential includes but isn't limited to arrhythmia, CHF, electrolyte abnormality, substance side effect. [id number] of 0, low risk, does not require AC.  Did not respond to IVP Dilt, ordered diltiazem infusion.  No acute lab abnormalities to explain etiology of patient's symptoms. Discussed with [facility] Medicine service and admitted to ICU for further monitoring and management of new onset AFib with RVR.    Amount and/or Complexity of Data Reviewed  External Data Reviewed: notes.     Details: Seen 11/15/22 and diagnosed with influenza   Labs: ordered. Decision-making details documented in ED Course.  ECG/medicine tests: ordered and independent interpretation performed.    Risk  OTC drugs.  Prescription drug management.  Decision regarding hospitalization.               ED Course as of 01/18/24 2223   Thu Jan 18, 2024 2109 WBC: 10.25  Normal  [AT]   2109 Hemoglobin(!): 13.4  Mild anemia [AT]   2124 Notified by charge RN that we do not have the capability to manage a dilt ggt and is requesting trial of IV metoprolol next. Contacting cardiology for other options prior to initiating.  [AT]   2145 Spoke with cardiology, Dr. Duarte, who confirms that patient needs IV dilt ggt and not IV metoprolol which would risk hypotension  [AT]   2218 Troponin I: 0.025  Normal  [AT]   2219 Magnesium : 1.8  Normal  [AT]   2219  Phosphorus Level: 4.1  Normal  [AT]   2219 Potassium: 4.1  Normal  [AT]      ED Course User Index  [AT] Minal Montana MD                             Clinical Impression:  Final diagnoses:  [I48.91] Atrial fibrillation with rapid ventricular response (Primary)          ED Disposition Condition    Admit                 Minal Montana MD  01/18/24 0854

## 2024-01-19 NOTE — HPI
Raulito Rothman is a 23-year-old male with obesity who presents with palpitations. Patient states that over the past 3 days he has had episodes of palpitations that spontaneously resolved.  He also noticed on his Apple watch that alerted him that he may have had an abnormal rhythm.  Reports a family history of atrial fibrillation age 30. Patient denies CP, SOB, edema, syncope, NV. Patient denies a history of CORBIN that he is aware of. He was noted to be in AF RVR in the ED which spontaneously converted to SR following coughing. Patient started on Toprol 25 mg daily. CHADSVASC 0.

## 2024-01-19 NOTE — SUBJECTIVE & OBJECTIVE
No past medical history on file.    No past surgical history on file.    Review of patient's allergies indicates:  No Known Allergies    No current facility-administered medications on file prior to encounter.     Current Outpatient Medications on File Prior to Encounter   Medication Sig    acetaminophen (TYLENOL) 500 MG tablet Take 2 tablets (1,000 mg total) by mouth every 6 (six) hours as needed.    ergocalciferol (ERGOCALCIFEROL) 50,000 unit Cap Take 1 capsule (50,000 Units total) by mouth every 7 days. (Patient not taking: Reported on 9/16/2023)    ibuprofen (ADVIL,MOTRIN) 600 MG tablet Take 1 tablet (600 mg total) by mouth every 6 (six) hours as needed for Pain.    ondansetron (ZOFRAN-ODT) 4 MG TbDL Take 1 tablet (4 mg total) by mouth every 6 (six) hours as needed (nausea).     Family History       Problem Relation (Age of Onset)    No Known Problems Mother, Father          Tobacco Use    Smoking status: Every Day     Types: Vaping with nicotine    Smokeless tobacco: Never   Substance and Sexual Activity    Alcohol use: Not on file    Drug use: Never    Sexual activity: Yes     Review of Systems   Constitutional:  Negative for activity change and appetite change.   HENT:  Negative for ear discharge and ear pain.    Eyes:  Negative for pain and redness.   Respiratory:  Negative for chest tightness.    Cardiovascular:  Negative for chest pain.   Gastrointestinal:  Negative for anal bleeding and blood in stool.   Endocrine: Negative for polydipsia and polyphagia.   Genitourinary:  Negative for frequency and genital sores.   Musculoskeletal:  Negative for gait problem and joint swelling.   Skin:  Negative for pallor and rash.   Allergic/Immunologic: Negative for environmental allergies and food allergies.   Neurological:  Negative for seizures and numbness.   Hematological:  Negative for adenopathy. Does not bruise/bleed easily.   Psychiatric/Behavioral:  Negative for confusion and decreased concentration.   "    Objective:     Vital Signs (Most Recent):  Temp: 98.1 °F (36.7 °C) (01/19/24 0636)  Pulse: (!) 135 (01/19/24 0636)  Resp: 16 (01/19/24 0636)  BP: 112/70 (01/19/24 0603)  SpO2: 97 % (01/19/24 0636) Vital Signs (24h Range):  Temp:  [98.1 °F (36.7 °C)-98.2 °F (36.8 °C)] 98.1 °F (36.7 °C)  Pulse:  [] 135  Resp:  [11-26] 16  SpO2:  [94 %-97 %] 97 %  BP: ()/(56-96) 112/70     Weight: (!) 154.2 kg (340 lb)  Body mass index is 42.5 kg/m².     Physical Exam  Constitutional:       General: He is not in acute distress.     Appearance: Normal appearance. He is not ill-appearing.   HENT:      Head: Normocephalic and atraumatic.      Right Ear: There is no impacted cerumen.      Left Ear: Tympanic membrane normal. There is no impacted cerumen.      Nose: No congestion or rhinorrhea.      Mouth/Throat:      Pharynx: No oropharyngeal exudate or posterior oropharyngeal erythema.   Eyes:      General:         Right eye: No discharge.         Left eye: No discharge.   Cardiovascular:      Rate and Rhythm: Tachycardia present. Rhythm irregular.      Heart sounds: No murmur heard.     No gallop.   Abdominal:      Tenderness: There is no abdominal tenderness. There is no guarding or rebound.      Hernia: No hernia is present.   Musculoskeletal:         General: No deformity.      Cervical back: No rigidity.      Right lower leg: No edema.   Lymphadenopathy:      Cervical: No cervical adenopathy.   Skin:     Findings: No bruising or lesion.   Neurological:      Mental Status: He is alert.      Motor: No weakness.      Gait: Gait normal.   Psychiatric:         Mood and Affect: Mood normal.         Behavior: Behavior normal.                Significant Labs: All pertinent labs within the past 24 hours have been reviewed.  Bilirubin:   Recent Labs   Lab 01/18/24 2045   BILITOT 0.3     Blood Culture: No results for input(s): "LABBLOO" in the last 48 hours.  BMP:   Recent Labs   Lab 01/18/24 2045         K 4.1 " "     CO2 19*   BUN 15   CREATININE 0.9   CALCIUM 9.3   MG 1.8     CMP:   Recent Labs   Lab 01/18/24 2045      K 4.1      CO2 19*      BUN 15   CREATININE 0.9   CALCIUM 9.3   PROT 7.1   ALBUMIN 3.7   BILITOT 0.3   ALKPHOS 103   AST 23   ALT 39   ANIONGAP 14     Cardiac Markers:   Recent Labs   Lab 01/18/24 2045   *     Lactic Acid: No results for input(s): "LACTATE" in the last 48 hours.  Lipid Panel: No results for input(s): "CHOL", "HDL", "LDLCALC", "TRIG", "CHOLHDL" in the last 48 hours.  Magnesium:   Recent Labs   Lab 01/18/24 2045   MG 1.8     Prealbumin: No results for input(s): "PREALBUMIN" in the last 48 hours.  Respiratory Culture: No results for input(s): "GSRESP", "RESPIRATORYC" in the last 48 hours.  Troponin:   Recent Labs   Lab 01/18/24 2045   TROPONINI 0.025     TSH: No results for input(s): "TSH" in the last 4320 hours.    Significant Imaging: I have reviewed all pertinent imaging results/findings within the past 24 hours.  I have reviewed and interpreted all pertinent imaging results/findings within the past 24 hours.  "

## 2024-01-19 NOTE — SUBJECTIVE & OBJECTIVE
No past medical history on file.    No past surgical history on file.    Review of patient's allergies indicates:  No Known Allergies    No current facility-administered medications on file prior to encounter.     Current Outpatient Medications on File Prior to Encounter   Medication Sig    acetaminophen (TYLENOL) 500 MG tablet Take 2 tablets (1,000 mg total) by mouth every 6 (six) hours as needed.    [DISCONTINUED] ergocalciferol (ERGOCALCIFEROL) 50,000 unit Cap Take 1 capsule (50,000 Units total) by mouth every 7 days. (Patient not taking: Reported on 9/16/2023)    [DISCONTINUED] ibuprofen (ADVIL,MOTRIN) 600 MG tablet Take 1 tablet (600 mg total) by mouth every 6 (six) hours as needed for Pain.    [DISCONTINUED] ondansetron (ZOFRAN-ODT) 4 MG TbDL Take 1 tablet (4 mg total) by mouth every 6 (six) hours as needed (nausea).     Family History       Problem Relation (Age of Onset)    No Known Problems Mother, Father          Tobacco Use    Smoking status: Every Day     Types: Vaping with nicotine    Smokeless tobacco: Never   Substance and Sexual Activity    Alcohol use: Not on file    Drug use: Never    Sexual activity: Yes     Review of Systems   Constitutional: Negative. Negative for diaphoresis and fever.   HENT: Negative.     Eyes: Negative.    Cardiovascular:  Positive for irregular heartbeat and palpitations. Negative for chest pain, leg swelling, near-syncope, orthopnea, paroxysmal nocturnal dyspnea and syncope.   Respiratory: Negative.  Negative for cough and shortness of breath.    Endocrine: Negative.    Hematologic/Lymphatic: Negative.    Skin: Negative.    Musculoskeletal: Negative.    Gastrointestinal: Negative.  Negative for nausea and vomiting.   Genitourinary: Negative.    Neurological: Negative.  Negative for focal weakness, light-headedness and weakness.   Psychiatric/Behavioral: Negative.     Allergic/Immunologic: Negative.      Objective:     Vital Signs (Most Recent):  Temp: 98.1 °F (36.7 °C)  (01/19/24 0636)  Pulse: 77 (01/19/24 1128)  Resp: 20 (01/19/24 1128)  BP: 109/74 (01/19/24 1254)  SpO2: 96 % (01/19/24 1128) Vital Signs (24h Range):  Temp:  [98.1 °F (36.7 °C)-98.2 °F (36.8 °C)] 98.1 °F (36.7 °C)  Pulse:  [] 77  Resp:  [11-26] 20  SpO2:  [94 %-98 %] 96 %  BP: ()/(56-96) 109/74     Weight: (!) 154.2 kg (340 lb)  Body mass index is 42.5 kg/m².    SpO2: 96 %         Intake/Output Summary (Last 24 hours) at 1/19/2024 1302  Last data filed at 1/19/2024 0654  Gross per 24 hour   Intake 102.27 ml   Output 700 ml   Net -597.73 ml       Lines/Drains/Airways       Airway  Duration                  Airway - Non-Surgical 01/19/24 1039 Nasal Cannula <1 day              Peripheral Intravenous Line  Duration                  Peripheral IV - Single Lumen 01/18/24 2045 20 G Right Antecubital <1 day         Peripheral IV - Single Lumen 01/19/24 0020 20 G Anterior;Right Forearm <1 day                     Physical Exam  Constitutional:       General: He is not in acute distress.     Appearance: He is obese. He is not diaphoretic.   HENT:      Head: Atraumatic.   Eyes:      General:         Right eye: No discharge.         Left eye: No discharge.   Cardiovascular:      Rate and Rhythm: Tachycardia present. Rhythm irregular.   Pulmonary:      Effort: Pulmonary effort is normal.      Breath sounds: Normal breath sounds.   Abdominal:      General: Bowel sounds are normal.      Palpations: Abdomen is soft.   Musculoskeletal:      Right lower leg: No edema.      Left lower leg: No edema.   Skin:     General: Skin is warm and dry.      Capillary Refill: Capillary refill takes less than 2 seconds.   Neurological:      Mental Status: He is alert and oriented to person, place, and time.   Psychiatric:         Mood and Affect: Mood normal.         Behavior: Behavior normal.         Thought Content: Thought content normal.         Judgment: Judgment normal.          Significant Labs: BMP:   Recent Labs   Lab  01/18/24 2045         K 4.1      CO2 19*   BUN 15   CREATININE 0.9   CALCIUM 9.3   MG 1.8   , CMP   Recent Labs   Lab 01/18/24 2045      K 4.1      CO2 19*      BUN 15   CREATININE 0.9   CALCIUM 9.3   PROT 7.1   ALBUMIN 3.7   BILITOT 0.3   ALKPHOS 103   AST 23   ALT 39   ANIONGAP 14   , and CBC   Recent Labs   Lab 01/18/24 2045   WBC 10.25   HGB 13.4*   HCT 39.2*          Significant Imaging: Echocardiogram: Transthoracic echo (TTE) complete (Cupid Only):   Results for orders placed or performed during the hospital encounter of 01/18/24   Echo   Result Value Ref Range    LA WIDTH 5.0 cm    BSA 2.86 m2    Walsh's Biplane MOD Ejection Fraction 40 %    LVOT stroke volume 58.46 cm3    LVIDd 4.98 3.5 - 6.0 cm    LV Systolic Volume 55.20 mL    LV Systolic Volume Index 20.1 mL/m2    LVIDs 3.62 2.1 - 4.0 cm    LV Diastolic Volume 117.15 mL    LV Diastolic Volume Index 42.60 mL/m2    IVS 0.87 0.6 - 1.1 cm    LVOT diameter 2.34 cm    LVOT area 4.3 cm2    FS 27 (A) 28 - 44 %    Left Ventricle Relative Wall Thickness 0.37 cm    Posterior Wall 0.93 0.6 - 1.1 cm    LV mass 157.15 g    LV Mass Index 57 g/m2    MV Peak E Kofi 0.63 m/s    TDI LATERAL 0.12 m/s    TDI SEPTAL 0.12 m/s    E/E' ratio 5.25 m/s    MV Peak A Kofi 0.33 m/s    TR Max Kofi 2.24 m/s    E/A ratio 1.91     IVRT 99.90 msec    E wave deceleration time 188.55 msec    LV SEPTAL E/E' RATIO 5.25 m/s    LA Volume Index 35.0 mL/m2    LV LATERAL E/E' RATIO 5.25 m/s    LA volume 96.17 cm3    PV Peak S Kofi 0.24 m/s    PV Peak D Kofi 0.23 m/s    Pulm vein S/D ratio 1.04     LVOT peak kofi 0.65 m/s    Left Ventricular Outflow Tract Mean Velocity 0.52 cm/s    Left Ventricular Outflow Tract Mean Gradient 1.16 mmHg    RVDD 3.24 cm    RV S' 9.48 cm/s    TAPSE 2.27 cm    LA size 4.20 cm    Left Atrium Minor Axis 5.50 cm    Left Atrium Major Axis 5.28 cm    LA volume (mod) 59.45 cm3    LA Volume Index (Mod) 21.6 mL/m2    RA Major Axis  5.24 cm    RA Width 4.79 cm    AV mean gradient 2 mmHg    AV peak gradient 4 mmHg    Ao peak josselyn 0.95 m/s    Ao VTI 20.00 cm    LVOT peak VTI 13.60 cm    AV valve area 2.92 cm²    AV Velocity Ratio 0.68     AV index (prosthetic) 0.68     CHAPARRITA by Velocity Ratio 2.94 cm²    MV stenosis pressure 1/2 time 54.68 ms    MV valve area p 1/2 method 4.02 cm2    Triscuspid Valve Regurgitation Peak Gradient 20 mmHg    Sinus 3.49 cm    STJ 3.29 cm    Ascending aorta 3.02 cm    Mean e' 0.12 m/s    ZLVIDS -12.53     ZLVIDD -18.17

## 2024-01-19 NOTE — ED NOTES
Received report from TEX Arellano. Patient lying back in recliner. Denies chest pain, SOB, or heart palpitations at this time. IV amiodarone continuously infusing at 1.8 mg/ml (33.3 ml/hr). Per Eileen, order to decreased amiodarone by half not done due to patient's HR remaining elevated in the 140s approved by Dr. Tomlinson. IV diltiazem continuously infusing at 5 mg/hr (15 ml/hr). IV heparin order has been discontinued. Patient current HR 130s, /91, O@ sat 97% on RA, RR 12. Patient appears calm and relaxed watching television. Patient remains NPO at this time and has been updated on plan of care, awaiting cardiology.

## 2024-01-19 NOTE — PLAN OF CARE
Patient transferred via stretcher ER Room 7 on cardiac portable monitor. VSS, AAOx4. No c/o pain.

## 2024-01-19 NOTE — ED NOTES
Pts HR continues to fluctuate between 170s and 80s. Pt remains in A Fib. Physician to order Cardizem drip.

## 2024-01-19 NOTE — PLAN OF CARE
Discharge orders noted for pt. SW requested follow up appointment for pt. Referrals to cardiology requested.      Future Appointments   Date Time Provider Department Center   1/31/2024  9:00 AM [facility]          01/19/24 1438   Final Note   Assessment Type Final Discharge Note   Anticipated Discharge Disposition Home   What phone number can be called within the next 1-3 days to see how you are doing after discharge? [phone]   Hospital Resources/Appts/Education Provided Post-Acute resouces added to AVS   Post-Acute Status   Discharge Delays None known at this time     Kellie Belcher, [facility]  ED Social Work  [phone]

## 2024-01-19 NOTE — ED NOTES
Patient given report and transferred to pre/post cath lab by RN and ED tech. Patient transferred via ED stretcher with transport monitor. Patient awake and alert, Cardizem infusing @ 12 mg/hr.

## 2024-01-19 NOTE — ANESTHESIA PREPROCEDURE EVALUATION
01/19/2024  Sukumar Rothman is a 23 y.o., male.      Pre-op Assessment    I have reviewed the Patient Summary Reports.    I have reviewed the NPO Status.   I have reviewed the Medications.     Review of Systems  Anesthesia Hx:  No problems with previous Anesthesia               Denies Personal Hx of Anesthesia complications.                    Social:  Non-Smoker       Cardiovascular:                    New onset a-fib with RVR, on diltiazem infusion with BP stable                          Pulmonary:  Pulmonary Normal                       Renal/:  Renal/ Normal                 Hepatic/GI:  Hepatic/GI Normal                 Neurological:  Neurology Normal                                      Endocrine:        Morbid Obesity / BMI > 40      Physical Exam  General: Well nourished, Cooperative, Alert and Oriented    Airway:  Mallampati: II   Mouth Opening: Normal  TM Distance: Normal  Tongue: Normal  Neck ROM: Normal ROM    Dental:  Intact    Heart:  Rate: Tachycardia  Rhythm: Irregularly Irregular        Anesthesia Plan  Type of Anesthesia, risks & benefits discussed:    Anesthesia Type: Gen Natural Airway  Intra-op Monitoring Plan: Standard ASA Monitors  Post Op Pain Control Plan: multimodal analgesia  Induction:  IV  Informed Consent: Informed consent signed with the Patient and all parties understand the risks and agree with anesthesia plan.  All questions answered.   ASA Score: 3  Day of Surgery Review of History & Physical: H&P Update referred to the surgeon/provider.    Ready For Surgery From Anesthesia Perspective.     .

## 2024-01-19 NOTE — ED NOTES
Dr. Cobain @ bedside. Discharge instructions and prescriptions reviewed with patient. Patient verbalizes understanding. LOVE ESCOBEDO.

## 2024-01-19 NOTE — ASSESSMENT & PLAN NOTE
Patient with Paroxysmal (<7 days) atrial fibrillation which is uncontrolled currently with Amiodarone and Digoxin. Patient is currently in atrial fibrillation.[id number] Score: The patient doesn't have any registry metric data available. HASBLED Score: 0. Anticoagulation not indicated due to low Chadvasc score .

## 2024-01-19 NOTE — ASSESSMENT & PLAN NOTE
Body mass index is 42.5 kg/m². Morbid obesity complicates all aspects of disease management from diagnostic modalities to treatment. Weight loss encouraged and health benefits explained to patient.

## 2024-01-19 NOTE — BRIEF OP NOTE
Transesophageal echo (DANETTE)/DCCV intra-procedure log documentation Brief Op Note     Patient was given sedation for DANETTE/DCCV and coughed - converted to NSR. There was no need to proceed with the DANETTE/DCCV. Patient will start Toprol 25 mg qd. HD stable.

## 2024-01-19 NOTE — PHARMACY MED REC
"Admission Medication History     The home medication history was taken by Era Fields CPhT.    Medication history obtained from, Patient Verified    You may go to "Admission" then "Reconcile Home Medications" tabs to review and/or act upon these items.     The home medication list has been updated by the Pharmacy department.   Please read ALL comments highlighted in yellow.   Please address this information as you see fit.    Feel free to contact us if you have any questions or require assistance.      The medications listed below were removed from the home medication list.  Please reorder if appropriate:  Patient reports no longer taking the following medication(s):  Ibuprofen 600 mg  Vitamin D 50,000 unit  Zofran ODT 4 mg        Era Fields CPhT.  Ext [phone]               .          "

## 2024-01-19 NOTE — ANESTHESIA POSTPROCEDURE EVALUATION
Anesthesia Post Evaluation    Patient: Sukumar Rothman    Procedure(s) Performed: Procedure(s) (LRB):  Transesophageal echo (DANETTE) intra-procedure log documentation (N/A)    Final Anesthesia Type: general      Patient location during evaluation: PACU  Patient participation: Yes- Able to Participate  Level of consciousness: awake and alert  Post-procedure vital signs: reviewed and stable  Pain management: adequate  Airway patency: patent    PONV status at discharge: No PONV  Anesthetic complications: no      Cardiovascular status: stable  Respiratory status: face mask  Hydration status: euvolemic  Follow-up not needed.              Vitals Value Taken Time   /63 01/19/24 1110   Temp 36 01/19/24 1110   Pulse 102 01/19/24 1110   Resp 24 01/19/24 1110   SpO2 95 01/19/24 1110         No case tracking events are documented in the log.      Pain/Beverly Score: Beverly Score: 10 (1/19/2024 10:43 AM)

## 2024-01-19 NOTE — ED NOTES
Pt remains in a fib. HR continues to fluctuate between 120s and 140s. Continues to report improvement in chest discomfort. Denies SOB, dizziness, and pain.

## 2024-01-19 NOTE — ED NOTES
Pt c/o intermittent tachycardia x 2 days and feeling heart pounding in chest x 1 day. Pt reports apple watch alerted pt to irregular heart rhythm prompting pt to come to ED. On arrival, pt in afib rvr w/ heart rate in 160s. Reports heart pounding sensation. Denies pain, SOB, dizziness, and all other complaints. Denies cardiac hx. Reports father diagnosed w/ afib at 30 years old. Pt AAOx3. Respirations even and unlabored. Skin is warm and dry. NAD noted. CB within reach.

## 2024-01-19 NOTE — HPI
Raulito Rothman is a 23-year-old male with a past medical history of obesity who presents with palpitations.    Patient states that over the past day he has had episodes of palpitations that spontaneously resolved.  He also noticed on his Apple watch they may have had an abnormal rhythm.  Reports a family history of atrial fibrillation age 30.  Presents for further evaluation.    Triage vitals was fairly unremarkable.  Physical exam significant for obesity and an abnormal rhythm.    CBC was significant for microcytic anemia.  CMP is fairly unremarkable.    EKG was significant for atrial fibrillation with RVR    Cardiology was consulted in the ED with the patient was started on diltiazem drip.  Unfortunately pressures and rhythm were poorly controlled and patient was started on amiodarone infusion.    Cardiology will see patient with possible cardioversion in the morning.    Medicine consulted for further care.

## 2024-01-19 NOTE — ED NOTES
Diltiazem drip d/c. Patient awake and alert, VSS. HR 65. Patient denies chest pain or heart palpitations. Patient sitting up in recliner eating lunch. PO metoprolol given, patient tolerated well. Patient asking when he can be discharged. Waiting for orders.

## 2024-01-19 NOTE — ASSESSMENT & PLAN NOTE
Newly diagnosed this admission   Now in SR  Continue BB  CHADSVASC 0- no indication for OAC at this time  CORBIN eval as OP  Follow up with cardiology as OP

## 2024-01-19 NOTE — CONSULTS
[facility] - Emergency Dept  Cardiology  Consult Note    Patient Name: Sukumar Rothman  MRN: [id number]  Admission Date: 1/18/2024  Hospital Length of Stay: 1 days  Code Status: Full Code   Attending Provider: Yanna Cobian MD   Consulting Provider: Santy White NP  Primary Care Physician: Susy, Primary Doctor  Principal Problem:Atrial fibrillation    Patient information was obtained from patient and ER records.     Inpatient consult to Cardiology-[facility]  Consult performed by: Santy White NP  Consult ordered by: Yanna Cobian MD        Subjective:     Chief Complaint:  Palpitations     HPI:   Raulito Rothman is a 23-year-old male with obesity who presents with palpitations. Patient states that over the past 3 days he has had episodes of palpitations that spontaneously resolved.  He also noticed on his Apple watch that alerted him that he may have had an abnormal rhythm.  Reports a family history of atrial fibrillation age 30. Patient denies CP, SOB, edema, syncope, NV. Patient denies a history of CORBIN that he is aware of. He was noted to be in AF RVR in the ED which spontaneously converted to SR following coughing. Patient started on Toprol 25 mg daily. CHADSVASC 0.    No past medical history on file.    No past surgical history on file.    Review of patient's allergies indicates:  No Known Allergies    No current facility-administered medications on file prior to encounter.     Current Outpatient Medications on File Prior to Encounter   Medication Sig    acetaminophen (TYLENOL) 500 MG tablet Take 2 tablets (1,000 mg total) by mouth every 6 (six) hours as needed.    [DISCONTINUED] ergocalciferol (ERGOCALCIFEROL) 50,000 unit Cap Take 1 capsule (50,000 Units total) by mouth every 7 days. (Patient not taking: Reported on 9/16/2023)    [DISCONTINUED] ibuprofen (ADVIL,MOTRIN) 600 MG tablet Take 1 tablet (600 mg total) by mouth every 6 (six) hours as needed for Pain.    [DISCONTINUED] ondansetron  (ZOFRAN-ODT) 4 MG TbDL Take 1 tablet (4 mg total) by mouth every 6 (six) hours as needed (nausea).     Family History       Problem Relation (Age of Onset)    No Known Problems Mother, Father          Tobacco Use    Smoking status: Every Day     Types: Vaping with nicotine    Smokeless tobacco: Never   Substance and Sexual Activity    Alcohol use: Not on file    Drug use: Never    Sexual activity: Yes     Review of Systems   Constitutional: Negative. Negative for diaphoresis and fever.   HENT: Negative.     Eyes: Negative.    Cardiovascular:  Positive for irregular heartbeat and palpitations. Negative for chest pain, leg swelling, near-syncope, orthopnea, paroxysmal nocturnal dyspnea and syncope.   Respiratory: Negative.  Negative for cough and shortness of breath.    Endocrine: Negative.    Hematologic/Lymphatic: Negative.    Skin: Negative.    Musculoskeletal: Negative.    Gastrointestinal: Negative.  Negative for nausea and vomiting.   Genitourinary: Negative.    Neurological: Negative.  Negative for focal weakness, light-headedness and weakness.   Psychiatric/Behavioral: Negative.     Allergic/Immunologic: Negative.      Objective:     Vital Signs (Most Recent):  Temp: 98.1 °F (36.7 °C) (01/19/24 0636)  Pulse: 77 (01/19/24 1128)  Resp: 20 (01/19/24 1128)  BP: 109/74 (01/19/24 1254)  SpO2: 96 % (01/19/24 1128) Vital Signs (24h Range):  Temp:  [98.1 °F (36.7 °C)-98.2 °F (36.8 °C)] 98.1 °F (36.7 °C)  Pulse:  [] 77  Resp:  [11-26] 20  SpO2:  [94 %-98 %] 96 %  BP: ()/(56-96) 109/74     Weight: (!) 154.2 kg (340 lb)  Body mass index is 42.5 kg/m².    SpO2: 96 %         Intake/Output Summary (Last 24 hours) at 1/19/2024 1302  Last data filed at 1/19/2024 0654  Gross per 24 hour   Intake 102.27 ml   Output 700 ml   Net -597.73 ml       Lines/Drains/Airways       Airway  Duration                  Airway - Non-Surgical 01/19/24 1039 Nasal Cannula <1 day              Peripheral Intravenous Line  Duration                   Peripheral IV - Single Lumen 01/18/24 2045 20 G Right Antecubital <1 day         Peripheral IV - Single Lumen 01/19/24 0020 20 G Anterior;Right Forearm <1 day                     Physical Exam  Constitutional:       General: He is not in acute distress.     Appearance: He is obese. He is not diaphoretic.   HENT:      Head: Atraumatic.   Eyes:      General:         Right eye: No discharge.         Left eye: No discharge.   Cardiovascular:      Rate and Rhythm: Tachycardia present. Rhythm irregular.   Pulmonary:      Effort: Pulmonary effort is normal.      Breath sounds: Normal breath sounds.   Abdominal:      General: Bowel sounds are normal.      Palpations: Abdomen is soft.   Musculoskeletal:      Right lower leg: No edema.      Left lower leg: No edema.   Skin:     General: Skin is warm and dry.      Capillary Refill: Capillary refill takes less than 2 seconds.   Neurological:      Mental Status: He is alert and oriented to person, place, and time.   Psychiatric:         Mood and Affect: Mood normal.         Behavior: Behavior normal.         Thought Content: Thought content normal.         Judgment: Judgment normal.          Significant Labs: BMP:   Recent Labs   Lab 01/18/24 2045         K 4.1      CO2 19*   BUN 15   CREATININE 0.9   CALCIUM 9.3   MG 1.8   , CMP   Recent Labs   Lab 01/18/24 2045      K 4.1      CO2 19*      BUN 15   CREATININE 0.9   CALCIUM 9.3   PROT 7.1   ALBUMIN 3.7   BILITOT 0.3   ALKPHOS 103   AST 23   ALT 39   ANIONGAP 14   , and CBC   Recent Labs   Lab 01/18/24 2045   WBC 10.25   HGB 13.4*   HCT 39.2*          Significant Imaging: Echocardiogram: Transthoracic echo (TTE) complete (Cupid Only):   Results for orders placed or performed during the hospital encounter of 01/18/24   Echo   Result Value Ref Range    LA WIDTH 5.0 cm    BSA 2.86 m2    Walsh's Biplane MOD Ejection Fraction 40 %    LVOT stroke volume 58.46 cm3    LVIDd  4.98 3.5 - 6.0 cm    LV Systolic Volume 55.20 mL    LV Systolic Volume Index 20.1 mL/m2    LVIDs 3.62 2.1 - 4.0 cm    LV Diastolic Volume 117.15 mL    LV Diastolic Volume Index 42.60 mL/m2    IVS 0.87 0.6 - 1.1 cm    LVOT diameter 2.34 cm    LVOT area 4.3 cm2    FS 27 (A) 28 - 44 %    Left Ventricle Relative Wall Thickness 0.37 cm    Posterior Wall 0.93 0.6 - 1.1 cm    LV mass 157.15 g    LV Mass Index 57 g/m2    MV Peak E Kofi 0.63 m/s    TDI LATERAL 0.12 m/s    TDI SEPTAL 0.12 m/s    E/E' ratio 5.25 m/s    MV Peak A Kofi 0.33 m/s    TR Max Kofi 2.24 m/s    E/A ratio 1.91     IVRT 99.90 msec    E wave deceleration time 188.55 msec    LV SEPTAL E/E' RATIO 5.25 m/s    LA Volume Index 35.0 mL/m2    LV LATERAL E/E' RATIO 5.25 m/s    LA volume 96.17 cm3    PV Peak S Kofi 0.24 m/s    PV Peak D Kofi 0.23 m/s    Pulm vein S/D ratio 1.04     LVOT peak kofi 0.65 m/s    Left Ventricular Outflow Tract Mean Velocity 0.52 cm/s    Left Ventricular Outflow Tract Mean Gradient 1.16 mmHg    RVDD 3.24 cm    RV S' 9.48 cm/s    TAPSE 2.27 cm    LA size 4.20 cm    Left Atrium Minor Axis 5.50 cm    Left Atrium Major Axis 5.28 cm    LA volume (mod) 59.45 cm3    LA Volume Index (Mod) 21.6 mL/m2    RA Major Axis 5.24 cm    RA Width 4.79 cm    AV mean gradient 2 mmHg    AV peak gradient 4 mmHg    Ao peak kofi 0.95 m/s    Ao VTI 20.00 cm    LVOT peak VTI 13.60 cm    AV valve area 2.92 cm²    AV Velocity Ratio 0.68     AV index (prosthetic) 0.68     CHAPARRITA by Velocity Ratio 2.94 cm²    MV stenosis pressure 1/2 time 54.68 ms    MV valve area p 1/2 method 4.02 cm2    Triscuspid Valve Regurgitation Peak Gradient 20 mmHg    Sinus 3.49 cm    STJ 3.29 cm    Ascending aorta 3.02 cm    Mean e' 0.12 m/s    ZLVIDS -12.53     ZLVIDD -18.17      Assessment and Plan:     * Atrial fibrillation  Newly diagnosed this admission   Now in SR  Continue BB  CHADSVASC 0- no indication for OAC at this time  CORBIN eval as OP  Follow up with cardiology as OP     Severe obesity  (BMI >= 40)  Weight loss encouraged  CORBIN eval as OP         VTE Risk Mitigation (From admission, onward)           Ordered     IP VTE HIGH RISK PATIENT  Once         01/18/24 2341     Place sequential compression device  Until discontinued         01/18/24 2341                    Thank you for your consult. I will sign off. Please contact us if you have any additional questions.    Santy White NP  Cardiology   [facility] - Emergency Dept

## 2024-01-19 NOTE — ED NOTES
Received report from Amb[facility]. Patient coughed and converted to SR and will be returning to ED with Cardizem drip infusing @ 12 mg/hr.

## 2024-01-20 NOTE — NURSING
PROACTIVE ROUNDING NURSING NOTE       Time of Visit: 0815    Admit Date: 2024  LOS: 1  Code Status: Prior   Date of Visit: 2024  : 2000  Age: 23 y.o.  Sex: male  Race: White  Bed: ED 07/EXAM 07  MRN: [id number]  Was the patient discharged from an ICU this admission? No   Was the patient discharged from a PACU within last 24 hours? Yes   Did the patient receive conscious sedation/general anesthesia in last 24 hours? No   Was the patient in the ED within the past 24 hours? Yes   Was the patient on NIPPV within the past 24 hours? No   Attending Physician: No att. providers found  Primary Service:    Time spent at the bedside: < 15 min    SITUATION    Notified by  ICU ER Hold  Reason for alert: New onset A-Fib    Diagnosis: Atrial fibrillation   has no past medical history on file.    Last Vitals:  Temp: 98.1 °F (36.7 °C) ( 0636)  Pulse: 75 ( 1414)  Resp: 21 ( 1338)  BP: 121/75 ( 1403)  SpO2: 96 % ( 1403)    24 Hour Vitals Range:  Temp:  [98.1 °F (36.7 °C)-98.2 °F (36.8 °C)]   Pulse:  []   Resp:  [11-26]   BP: ()/(56-96)   SpO2:  [94 %-98 %]     Clinical Issues: Dysrhythmia    ASSESSMENT/INTERVENTIONS  - Patient visited in ER along with pulm/critical care fellow, Dr. Cohen at 0815.  Patient sitting up in chair.  AAO x 4.  Amiodarone and Diltiazem drips infusing.  RNKaruna, at bedside.  - Cardiology consulted    RECOMMENDATIONS  - Follow-up with Cardiology recs    PROVIDER ESCALATION    Physician escalation: No    Disposition:Remain in room ER 7    FOLLOW UP    UPDATE: Patient converted to NSR prior to DANETTE/DCCV.  Patient started on Toprol XL and discharged home.

## 2024-01-25 NOTE — DISCHARGE SUMMARY
"[facility] Discharge Summary    Attending Physician: Mango    Date of Admit: 1/18/2024  Date of Discharge: 1/19/2024    Discharge to: Home  Condition: Stable    Discharge Diagnoses     AF with RVR-resolved  CHADSVASC = 0    Consultants and Procedures     Consultants:  Cardiology    Procedures:   None    Brief History of Present Illness       Raulito Rothman is a 23-year-old male with a past medical history of obesity who presents with palpitations.     Patient states that over the past day he has had episodes of palpitations that spontaneously resolved.  He also noticed on his Apple watch they may have had an abnormal rhythm.  Reports a family history of atrial fibrillation age 30.  Presents for further evaluation.     Triage vitals was fairly unremarkable.  Physical exam significant for obesity and an abnormal rhythm.     CBC was significant for microcytic anemia.  CMP is fairly unremarkable.     EKG was significant for atrial fibrillation with RVR     Cardiology was consulted in the ED with the patient was started on diltiazem drip.  Unfortunately pressures and rhythm were poorly controlled and patient was started on amiodarone infusion.     Cardiology will see patient with possible cardioversion in the morning.       For the full HPI please refer to the History & Physical from this admission.    Hospital Course By Problem with Pertinent Findings     New-onset AFib with RVR  Family history of atrial fibrillation at young age  Patient was spontaneously converted with coughing in preparation for cardioversion  Rx for low-dose metoprolol  Close PCP and Cards follow up    Discharge Time: Greater than 30 minutes? No    /75   Pulse 75   Temp 98.1 °F (36.7 °C) (Oral)   Resp (!) 21   Ht 6' 3" (1.905 m)   Wt (!) 154.2 kg (340 lb)   SpO2 96%   BMI 42.50 kg/m²       Discharge Medications        Medication List        START taking these medications      metoprolol succinate 25 MG 24 hr " tablet  Commonly known as: TOPROL-XL  Take 1 tablet (25 mg total) by mouth once daily.            CONTINUE taking these medications      acetaminophen 500 MG tablet  Commonly known as: TYLENOL  Take 2 tablets (1,000 mg total) by mouth every 6 (six) hours as needed.               Where to Get Your Medications        You can get these medications from any pharmacy    Bring a paper prescription for each of these medications  metoprolol succinate 25 MG 24 hr tablet         Discharge Information:   Diet:  Regular    Physical Activity:  As tolerated    Instructions:  1. Take all medications as prescribed  2. Keep all follow-up appointments  3. Return to the hospital or call your primary care physicians if any worsening symptoms such as syncope, dizziness, palpitations, chest pain or other concerns occur.      Follow-Up Appointments:  PCP in 1 week  Cardiology in 1-2 weeks      Follow up items for PCP and tests that have not resulted at time of discharge:   None      Yanna Cobian MD  [facility] Medicine

## 2024-01-31 ENCOUNTER — OFFICE VISIT (OUTPATIENT)
Dept: CARDIOLOGY | Facility: CLINIC | Age: 24
End: 2024-01-31
Payer: COMMERCIAL

## 2024-01-31 ENCOUNTER — OFFICE VISIT (OUTPATIENT)
Dept: FAMILY MEDICINE | Facility: HOSPITAL | Age: 24
End: 2024-01-31
Attending: FAMILY MEDICINE
Payer: COMMERCIAL

## 2024-01-31 ENCOUNTER — PATIENT MESSAGE (OUTPATIENT)
Dept: CARDIOLOGY | Facility: CLINIC | Age: 24
End: 2024-01-31

## 2024-01-31 VITALS
BODY MASS INDEX: 46.46 KG/M2 | SYSTOLIC BLOOD PRESSURE: 112 MMHG | DIASTOLIC BLOOD PRESSURE: 75 MMHG | RESPIRATION RATE: 18 BRPM | HEART RATE: 65 BPM | WEIGHT: 315 LBS

## 2024-01-31 VITALS
HEART RATE: 58 BPM | SYSTOLIC BLOOD PRESSURE: 118 MMHG | DIASTOLIC BLOOD PRESSURE: 67 MMHG | HEIGHT: 75 IN | BODY MASS INDEX: 39.17 KG/M2 | WEIGHT: 315 LBS

## 2024-01-31 DIAGNOSIS — I48.0 PAROXYSMAL ATRIAL FIBRILLATION: Primary | Chronic | ICD-10-CM

## 2024-01-31 DIAGNOSIS — I48.91 ATRIAL FIBRILLATION WITH RAPID VENTRICULAR RESPONSE: ICD-10-CM

## 2024-01-31 DIAGNOSIS — E66.01 SEVERE OBESITY (BMI >= 40): ICD-10-CM

## 2024-01-31 PROBLEM — R10.9 ABDOMINAL CRAMPING: Status: RESOLVED | Noted: 2020-05-23 | Resolved: 2024-01-31

## 2024-01-31 PROCEDURE — 3078F DIAST BP <80 MM HG: CPT | Mod: CPTII,S$GLB,, | Performed by: INTERNAL MEDICINE

## 2024-01-31 PROCEDURE — 99999 PR PBB SHADOW E&M-EST. PATIENT-LVL III: CPT | Mod: PBBFAC,,, | Performed by: INTERNAL MEDICINE

## 2024-01-31 PROCEDURE — 3008F BODY MASS INDEX DOCD: CPT | Mod: CPTII,S$GLB,, | Performed by: INTERNAL MEDICINE

## 2024-01-31 PROCEDURE — 99213 OFFICE O/P EST LOW 20 MIN: CPT | Performed by: FAMILY MEDICINE

## 2024-01-31 PROCEDURE — 1160F RVW MEDS BY RX/DR IN RCRD: CPT | Mod: CPTII,S$GLB,, | Performed by: INTERNAL MEDICINE

## 2024-01-31 PROCEDURE — 1111F DSCHRG MED/CURRENT MED MERGE: CPT | Mod: CPTII,S$GLB,, | Performed by: INTERNAL MEDICINE

## 2024-01-31 PROCEDURE — 1159F MED LIST DOCD IN RCRD: CPT | Mod: CPTII,S$GLB,, | Performed by: INTERNAL MEDICINE

## 2024-01-31 PROCEDURE — 99204 OFFICE O/P NEW MOD 45 MIN: CPT | Mod: S$GLB,,, | Performed by: INTERNAL MEDICINE

## 2024-01-31 PROCEDURE — 3074F SYST BP LT 130 MM HG: CPT | Mod: CPTII,S$GLB,, | Performed by: INTERNAL MEDICINE

## 2024-01-31 NOTE — PROGRESS NOTES
"Subjective:       Patient ID: Sukumar Rothman is a 23 y.o. male.    Chief Complaint: Hospital Follow Up    23 yr old male with elevated bmi 46 and recent new onset of Afib and rvr discharged from the hospital on 1/19/2024 started on metoprolol which he is tolerating well. Scheduled to see cardiology today. Father with similar Afib issue. Periodically gets chest tightness once or twice per day lasting more than a minute. No syncope or CP. Works as food and beverage vendor. Has a PCP who sees a [place] [facility] infrequently.       Review of Systems    Objective:      Vitals:    01/31/24 0927   BP: 118/67   Pulse: (!) 58   Weight: (!) 168.6 kg (371 lb 11.1 oz)   Height: 6' 3" (1.905 m)     Physical Exam  Vitals and nursing note reviewed.   Constitutional:       General: He is not in acute distress.     Appearance: He is well-developed. He is obese. He is not ill-appearing.   HENT:      Head: Normocephalic and atraumatic.      Nose: Nose normal.   Eyes:      Conjunctiva/sclera: Conjunctivae normal.   Cardiovascular:      Rate and Rhythm: Normal rate and regular rhythm.      Heart sounds: Normal heart sounds.   Pulmonary:      Effort: Pulmonary effort is normal. No respiratory distress.      Breath sounds: Normal breath sounds. No wheezing or rales.   Abdominal:      General: There is no distension.      Palpations: Abdomen is soft.      Tenderness: There is no abdominal tenderness.   Neurological:      Mental Status: He is alert.      Cranial Nerves: No cranial nerve deficit.             Lab Results   Component Value Date     01/18/2024    K 4.1 01/18/2024     01/18/2024    CO2 19 (L) 01/18/2024    BUN 15 01/18/2024    CREATININE 0.9 01/18/2024    ANIONGAP 14 01/18/2024     No results found for: "HGBA1C"  Lab Results   Component Value Date     (H) 01/18/2024       Lab Results   Component Value Date    WBC 10.25 01/18/2024    HGB 13.4 (L) 01/18/2024    HCT 39.2 (L) 01/18/2024     01/18/2024    GRAN 5.4 " "01/18/2024    GRAN 52.7 01/18/2024     No results found for: "CHOL", "HDL", "LDLCALC", "TRIG"       Current Outpatient Medications:     metoprolol succinate (TOPROL-XL) 25 MG 24 hr tablet, Take 1 tablet (25 mg total) by mouth once daily., Disp: 30 tablet, Rfl: 2    acetaminophen (TYLENOL) 500 MG tablet, Take 2 tablets (1,000 mg total) by mouth every 6 (six) hours as needed., Disp: 50 tablet, Rfl: 0        Assessment:       1. Paroxysmal atrial fibrillation    2. Severe obesity (BMI >= 40)           Plan:       Paroxysmal Afib   New onset of Afib now appears to be in sinus rhythm with cardiology apt later this afternoon. Periodic light headedness with chest tightness lasting a minute or more bu not on a daily basis. To continue on metoprolol ER 25 mg and follow up with cardiology for further recs.     Has PCP at [facility] which he is encouraged to follow up with for preventative med purposes.     Morbid obesity: he plans on exercising more once gets the ok form cardiology. Reviewed heart healthy foods. Reduction in sugar filled drinks.             "

## 2024-01-31 NOTE — PROGRESS NOTES
HISTORY:    23-year-old male with a history of paroxysmal atrial fibrillation and obesity presenting for initial evaluation by me.      Patient recently presented to [facility] with palpitations was found to be in AFib.  He ended up spontaneously cardioverting. Had palpitations for 1 day prior. Hasn't had since.     Does have some chest tightness at times. Some SOB. No edema.     No infections, heavy etoh use etc leading upto this.    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 Back to normal workload and activities without limitation.     Currently tolerates metoprolol 25 x 1. Some light headedness when he moves to a standing position.     PHYSICAL EXAM:    Vitals:    01/31/24 1306   BP: 112/75   Pulse: 65   Resp: 18       NAD, A+Ox3.  No jvd, no bruit.  RRR nml s1,s2. No murmurs.  CTA B no wheezes or crackles.  No edema.    LABS/STUDIES (imaging reviewed during clinic visit):    January 2024 hemoglobin 13.4 with MCV of 81.  Creatinine 0.9 with a BUN of 15.  Troponin negative .  TSH normal.  ECG January 18th atrial fibrillation with RVR.  January 19th sinus rhythm with no Q-waves or ST changes.  TTE January 19, 2024 normal LV size and function with EF of 55-60%.  Normal diastology.  CVP 3.     ASSESSMENT & PLAN:    1. Paroxysmal atrial fibrillation    2. Atrial fibrillation with rapid ventricular response    3. Severe obesity (BMI >= 40)        Orders Placed This Encounter    Ambulatory referral/consult to Sleep Disorders      Paroxysmal atrial fibrillation in sinus rhythm at this time.  Okay to cut metoprolol to 12.5 x 1. Wears an apple watch. CHADS-VASc 0 without an indication for anticoagulation.    Recommend aggressive weight loss strategies and initiation of an exercise regimen.  Will refer for sleep study. Nml TSH. Minimal etoh use. Trying to reduce caffeine intake.     Follow up in about 6 months (around 7/31/2024).    Fernando Calderon MD

## 2024-03-20 ENCOUNTER — PATIENT MESSAGE (OUTPATIENT)
Dept: CARDIOLOGY | Facility: CLINIC | Age: 24
End: 2024-03-20
Payer: COMMERCIAL

## 2024-04-17 ENCOUNTER — PATIENT MESSAGE (OUTPATIENT)
Dept: CARDIOLOGY | Facility: CLINIC | Age: 24
End: 2024-04-17
Payer: COMMERCIAL

## 2024-04-17 RX ORDER — METOPROLOL SUCCINATE 25 MG/1
12.5 TABLET, EXTENDED RELEASE ORAL DAILY
Qty: 90 TABLET | Refills: 3 | Status: SHIPPED | OUTPATIENT
Start: 2024-04-17 | End: 2024-04-18 | Stop reason: SDUPTHER

## 2024-04-19 RX ORDER — METOPROLOL SUCCINATE 25 MG/1
12.5 TABLET, EXTENDED RELEASE ORAL DAILY
Qty: 90 TABLET | Refills: 3 | Status: SHIPPED | OUTPATIENT
Start: 2024-04-19 | End: 2024-04-19

## 2024-04-19 RX ORDER — METOPROLOL SUCCINATE 25 MG/1
12.5 TABLET, EXTENDED RELEASE ORAL DAILY
Qty: 90 TABLET | Refills: 3 | Status: SHIPPED | OUTPATIENT
Start: 2024-04-19 | End: 2026-04-09

## 2024-05-22 ENCOUNTER — OFFICE VISIT (OUTPATIENT)
Dept: PRIMARY CARE CLINIC | Facility: CLINIC | Age: 24
End: 2024-05-22
Payer: COMMERCIAL

## 2024-05-22 VITALS
TEMPERATURE: 98 F | OXYGEN SATURATION: 98 % | WEIGHT: 315 LBS | HEART RATE: 63 BPM | BODY MASS INDEX: 39.17 KG/M2 | DIASTOLIC BLOOD PRESSURE: 86 MMHG | SYSTOLIC BLOOD PRESSURE: 128 MMHG | HEIGHT: 75 IN

## 2024-05-22 DIAGNOSIS — R07.89 CHEST TIGHTNESS: Primary | ICD-10-CM

## 2024-05-22 DIAGNOSIS — E66.01 SEVERE OBESITY (BMI >= 40): ICD-10-CM

## 2024-05-22 DIAGNOSIS — Z86.79 HISTORY OF ATRIAL FIBRILLATION: ICD-10-CM

## 2024-05-22 LAB
OHS QRS DURATION: 84 MS
OHS QTC CALCULATION: 388 MS

## 2024-05-22 PROCEDURE — 3079F DIAST BP 80-89 MM HG: CPT | Mod: CPTII,S$GLB,,

## 2024-05-22 PROCEDURE — 3008F BODY MASS INDEX DOCD: CPT | Mod: CPTII,S$GLB,,

## 2024-05-22 PROCEDURE — 1159F MED LIST DOCD IN RCRD: CPT | Mod: CPTII,S$GLB,,

## 2024-05-22 PROCEDURE — 99999 PR PBB SHADOW E&M-EST. PATIENT-LVL III: CPT | Mod: PBBFAC,,,

## 2024-05-22 PROCEDURE — 93010 ELECTROCARDIOGRAM REPORT: CPT | Mod: S$GLB,,, | Performed by: INTERNAL MEDICINE

## 2024-05-22 PROCEDURE — 93005 ELECTROCARDIOGRAM TRACING: CPT | Mod: S$GLB,,,

## 2024-05-22 PROCEDURE — 99214 OFFICE O/P EST MOD 30 MIN: CPT | Mod: 25,S$GLB,,

## 2024-05-22 PROCEDURE — 3074F SYST BP LT 130 MM HG: CPT | Mod: CPTII,S$GLB,,

## 2024-05-22 PROCEDURE — 3044F HG A1C LEVEL LT 7.0%: CPT | Mod: CPTII,S$GLB,,

## 2024-05-22 NOTE — LETTER
May 22, 2024      [facility]  [address]  [facility] [address]       Patient: Sukumar Rothman   YOB: 2000  Date of Visit: 05/22/2024    To Whom It May Concern:    Gilberto Rothman  was at [facility] on 05/22/2024. The patient may return to work on 05/24/2024 with no restrictions. If you have any questions or concerns, or if I can be of further assistance, please do not hesitate to contact me.    Sincerely,        PAL Littlejohn

## 2024-05-22 NOTE — PROGRESS NOTES
SUBJECTIVE     Chief Complaint   Patient presents with    Chest Pain       HPI  Sukumar Rothman is a 23 y.o. male with multiple medical diagnoses as listed in the medical history and problem list that presents for annual exam. Pt has been doing well since his last visit. He has a good appetite and eats well. He does not exercise. He sleeps for ~5-6 hours nightly. Pt does not take OTC supplements. He does have any current stressors, tries to lay down or plays on phone. Pt is UTD on age appropriate CA screening. Dental exam is UTD. Eye exam is not UTD.     Patient reports occasional chest tightness/discomfort. Patient reports worse pain is a 4-5/10 on pain scale. Patient states current pain is 1/10. Patient denies any shortness of breath, palpitations, fever, headaches, dizziness, visual changes, weakness, or N/V at present time. Patient reports he was at work and had some chest tightness and was instructed to go home and get evaluated prior to returning to work. Patient reports he feels fine at present time.     Patient also states he heart rate ranges from , but at night he notices that it drops in the 50s. Patient has f/u appointment with Cardiologist in July. Patient reports he is trying to get a sooner appointment. Patient denies any syncopal episodes or falls.     Chest Pain   This is a recurrent problem. The current episode started today. The onset quality is gradual. The problem occurs intermittently. The problem has been unchanged. The pain is present in the substernal region. The pain is at a severity of 1/10. The pain is mild. The quality of the pain is described as tightness. The pain does not radiate. Associated symptoms include dizziness. Pertinent negatives include no abdominal pain, back pain, cough, exertional chest pressure, fever, headaches, irregular heartbeat, leg pain, lower extremity edema, malaise/fatigue, nausea, near-syncope, numbness, orthopnea, palpitations, PND, shortness of breath,  sputum production, syncope, vomiting or weakness. The pain is aggravated by nothing. Risk factors include male gender and obesity. Past medical history comments: A-fib   His family medical history is significant for heart disease.       PAST MEDICAL HISTORY:  Past Medical History:   Diagnosis Date    A-fib        PAST SURGICAL HISTORY:  Past Surgical History:   Procedure Laterality Date    ECHOCARDIOGRAM,TRANSESOPHAGEAL N/A 1/19/2024    Procedure: Transesophageal echo (DANETTE) intra-procedure log documentation;  Surgeon: Shayne Hayes MD;  Location: [facility] CATH LAB/EP;  Service: Cardiology;  Laterality: N/A;       SOCIAL HISTORY:  Social History     Socioeconomic History    Marital status: Single   Tobacco Use    Smoking status: Every Day     Types: Vaping with nicotine    Smokeless tobacco: Never   Substance and Sexual Activity    Drug use: Never    Sexual activity: Yes     Social Determinants of Health     Financial Resource Strain: Low Risk  (1/31/2024)    Overall Financial Resource Strain (CARDIA)     Difficulty of Paying Living Expenses: Not very hard   Food Insecurity: No Food Insecurity (1/31/2024)    Hunger Vital Sign     Worried About Running Out of Food in the Last Year: Never true     Ran Out of Food in the Last Year: Never true   Transportation Needs: No Transportation Needs (1/31/2024)    PRAPARE - Transportation     Lack of Transportation (Medical): No     Lack of Transportation (Non-Medical): No   Physical Activity: Insufficiently Active (1/31/2024)    Exercise Vital Sign     Days of Exercise per Week: 2 days     Minutes of Exercise per Session: 30 min   Stress: Stress Concern Present (1/31/2024)    [place] [employer] - Occupational Stress Questionnaire     Feeling of Stress : To some extent   Housing Stability: Low Risk  (1/31/2024)    Housing Stability Vital Sign     Unable to Pay for Housing in the Last Year: No     Number of Places Lived in the Last Year: 1     Unstable  "Housing in the Last Year: No       FAMILY HISTORY:  Family History   Problem Relation Name Age of Onset    No Known Problems Mother      No Known Problems Father         ALLERGIES AND MEDICATIONS: updated and reviewed.  Review of patient's allergies indicates:  No Known Allergies  Current Outpatient Medications   Medication Sig Dispense Refill    metoprolol succinate (TOPROL-XL) 25 MG 24 hr tablet Take 0.5 tablets (12.5 mg total) by mouth once daily. 90 tablet 3     No current facility-administered medications for this visit.       ROS  Review of Systems   Constitutional:  Negative for activity change, chills, fatigue, fever and malaise/fatigue.   HENT:  Negative for congestion, facial swelling, rhinorrhea and sore throat.    Respiratory:  Negative for cough, sputum production, chest tightness and shortness of breath.    Cardiovascular:  Positive for chest pain. Negative for palpitations, orthopnea, syncope, PND and near-syncope.   Gastrointestinal:  Negative for abdominal pain, constipation, diarrhea, nausea and vomiting.   Genitourinary:  Negative for dysuria.   Musculoskeletal:  Negative for back pain and joint swelling.   Skin:  Negative for rash and wound.   Neurological:  Positive for dizziness. Negative for speech difficulty, weakness, light-headedness, numbness and headaches.   Psychiatric/Behavioral:  Negative for behavioral problems, dysphoric mood and sleep disturbance. The patient is not nervous/anxious.        OBJECTIVE     Physical Exam  Vitals:    05/22/24 1125   BP: 128/86   Pulse: 63   Temp: 98.4 °F (36.9 °C)    Body mass index is 45.36 kg/m².  Weight: (!) 164.6 kg (362 lb 14 oz)   Height: 6' 3" (190.5 cm)     Physical Exam  Vitals reviewed.   Constitutional:       General: He is not in acute distress.  HENT:      Right Ear: External ear normal.      Left Ear: External ear normal.      Nose: Nose normal.      Mouth/Throat:      Mouth: Mucous membranes are moist.   Eyes:      Extraocular Movements: " Extraocular movements intact.      Conjunctiva/sclera: Conjunctivae normal.      Pupils: Pupils are equal, round, and reactive to light.   Cardiovascular:      Rate and Rhythm: Normal rate and regular rhythm.      Pulses: Normal pulses.      Heart sounds: Normal heart sounds.      Comments: Apical pulse 62  Pulmonary:      Effort: Pulmonary effort is normal. No respiratory distress.      Breath sounds: Normal breath sounds.   Chest:      Chest wall: No tenderness.   Abdominal:      General: Bowel sounds are normal. There is no distension.      Palpations: Abdomen is soft.   Musculoskeletal:         General: No swelling. Normal range of motion.      Cervical back: Normal range of motion.   Skin:     General: Skin is warm and dry.      Findings: No rash.   Neurological:      General: No focal deficit present.      Mental Status: He is alert and oriented to person, place, and time.   Psychiatric:         Mood and Affect: Mood normal.         Behavior: Behavior normal.         Health Maintenance         Date Due Completion Date    Lipid Panel Never done ---    Pneumococcal Vaccines (Age 0-64) (1 of 2 - PCV) 08/20/2004 2/7/2003    COVID-19 Vaccine (3 - 2023-24 season) 09/01/2023 8/15/2021    Influenza Vaccine (Season Ended) 09/01/2024 10/23/2014    TETANUS VACCINE 04/13/2031 4/13/2021              ASSESSMENT     23 y.o. male with     1. Chest tightness    2. Paroxysmal atrial fibrillation    3. Severe obesity (BMI >= 40)        PLAN:     1. Chest tightness  Recurrent. Ordered EKG, Chest xray, and labs. ounseled patient on signs of worsening or concerning symptoms which include new onset of dyspnea, chest pain/pressure with exertion, worsening dyspnea, dizziness, and mental status changes such as confusion. Advised patient to report to emergency department for evaluation if any of those symptoms present patient verbalized understanding.??    - IN OFFICE EKG 12-LEAD (to Muse)  - X-Ray Chest PA And Lateral; Future  -  Comprehensive Metabolic Panel; Future  - CBC Auto Differential; Future  - BNP; Future  - TROPONIN I; Future    2. History of atrial fibrillation  Recurrent. Ordered EKG, Chest xray, and labs. ounseled patient on signs of worsening or concerning symptoms which include new onset of dyspnea, chest pain/pressure with exertion, worsening dyspnea, dizziness, and mental status changes such as confusion. Advised patient to report to emergency department for evaluation if any of those symptoms present patient verbalized understanding.??   - IN OFFICE EKG 12-LEAD (to Muse)  - X-Ray Chest PA And Lateral; Future  - Lipid Panel; Future  - Comprehensive Metabolic Panel; Future  - CBC Auto Differential; Future  - BNP; Future  - TROPONIN I; Future    3. Severe obesity (BMI >= 40)  - Discussed importance of eating a prudent diet and exercising?    - Hemoglobin A1C; Future  - Lipid Panel; Future  - TSH; Future          Tessie Cordero, MSN, APRN, FNP-C  [facility]  05/22/2024 11:39 AM      Follow up if symptoms worsen or fail to improve.

## 2024-05-23 ENCOUNTER — HOSPITAL ENCOUNTER (OUTPATIENT)
Dept: RADIOLOGY | Facility: HOSPITAL | Age: 24
Discharge: HOME | End: 2024-05-23
Payer: COMMERCIAL

## 2024-05-23 DIAGNOSIS — R07.89 CHEST TIGHTNESS: ICD-10-CM

## 2024-05-23 DIAGNOSIS — Z86.79 HISTORY OF ATRIAL FIBRILLATION: ICD-10-CM

## 2024-05-23 PROCEDURE — 71046 X-RAY EXAM CHEST 2 VIEWS: CPT | Mod: 26,,, | Performed by: RADIOLOGY

## 2024-05-23 PROCEDURE — 71046 X-RAY EXAM CHEST 2 VIEWS: CPT | Mod: TC,FY
